# Patient Record
Sex: MALE | Race: BLACK OR AFRICAN AMERICAN | Employment: FULL TIME | URBAN - METROPOLITAN AREA
[De-identification: names, ages, dates, MRNs, and addresses within clinical notes are randomized per-mention and may not be internally consistent; named-entity substitution may affect disease eponyms.]

---

## 2017-01-04 ENCOUNTER — HOSPITAL ENCOUNTER (OUTPATIENT)
Dept: PHYSICAL THERAPY | Age: 27
Discharge: HOME | End: 2017-01-04
Payer: SELF-PAY

## 2017-01-04 PROCEDURE — 97016 VASOPNEUMATIC DEVICE THERAPY: CPT

## 2017-01-04 PROCEDURE — 97140 MANUAL THERAPY 1/> REGIONS: CPT

## 2017-01-04 PROCEDURE — 97110 THERAPEUTIC EXERCISES: CPT

## 2017-01-04 NOTE — PROGRESS NOTES
PT DAILY TREATMENT NOTE     Patient Name: Casa Leo  Date:2017  : 1990  [x]  Patient  Verified  Payor: Angelina Henley / Plan: Giulia Rey PPO / Product Type: PPO /    In time:355  Out time:430  Total Treatment Time (min): 35  Visit #: 2 of 16    Treatment Area: Pain in shoulder region, right [M25.511]    SUBJECTIVE  Pain Level (0-10 scale): 7/10  Any medication changes, allergies to medications, adverse drug reactions, diagnosis change, or new procedure performed?: [x] No    [] Yes (see summary sheet for update)  Subjective functional status/changes:   [] No changes reported  Sometimes I forget I had a surgery and I go to move my arm.     OBJECTIVE    Modality rationale: decrease inflammation and decrease pain to improve the patients ability to increase activity/position tolerance   Min Type Additional Details    [] Estim:  []Unatt       []IFC  []Premod                        []Other:  []w/ice   []w/heat  Position:  Location:    [] Estim: []Att    []TENS instruct  []NMES                    []Other:  []w/US   []w/ice   []w/heat  Position:  Location:    []  Traction: [] Cervical       []Lumbar                       [] Prone          []Supine                       []Intermittent   []Continuous Lbs:  [] before manual  [] after manual    []  Ultrasound: []Continuous   [] Pulsed                           []1MHz   []3MHz W/cm2:  Location:    []  Iontophoresis with dexamethasone         Location: [] Take home patch   [] In clinic    []  Ice     []  heat  []  Ice massage  []  Laser   []  Anodyne Position:  Location:    []  Laser with stim  []  Other:  Position:  Location:   10 [x]  Vasopneumatic Device Pressure:       [] lo [x] med [] hi   Temperature: [x] lo [] med [] hi   [] Skin assessment post-treatment:  []intact []redness- no adverse reaction    []redness - adverse reaction:      min []Eval                  []Re-Eval       17 min Therapeutic Exercise:  [x] See flow sheet :   Rationale: increase ROM and increase strength to improve the patients ability to in prep for active use     min Therapeutic Activity:  []  See flow sheet :         min Neuromuscular Re-education:  []  See flow sheet :       8 min Manual Therapy:  Right shoulder PROM in supine   Rationale: increase ROM and increase tissue extensibility to restore normal joint mobility for ADL's     min Gait Training:  ___ feet with ___ device on level surfaces with ___ level of assist   Rationale: With   [] TE   [] TA   [] neuro   [] other: Patient Education: [x] Review HEP    [] Progressed/Changed HEP based on:   [] positioning   [] body mechanics   [] transfers   [] heat/ice application    [] other:      Other Objective/Functional Measures: none taken today     Pain Level (0-10 scale) post treatment: 4/10    ASSESSMENT/Changes in Function: 1st session since eval    Patient will continue to benefit from skilled PT services to modify and progress therapeutic interventions, address ROM deficits, address strength deficits, analyze and address soft tissue restrictions, analyze and cue movement patterns and assess and modify postural abnormalities to attain remaining goals.      [x]  See Plan of Care  []  See progress note/recertification  []  See Discharge Summary           PLAN  []  Upgrade activities as tolerated     [x]  Continue plan of care  []  Update interventions per flow sheet       []  Discharge due to:_  []  Other:_      Anika Lowry PT 1/4/2017  3:51 PM    Future Appointments  Date Time Provider Wendi Yao   1/4/2017 4:00 PM BRAULIO Lockhart [facility]   1/9/2017 4:00 PM LITZY Wilson [facility]   1/13/2017 8:30 AM LITZY Wilson [facility]   1/16/2017 12:00 PM BRAULIO Lockhart [facility]   1/20/2017 10:30 AM THE [facility] PT VICTORY [facility]   1/23/2017 11:30 AM LITZY Wilson [facility]   1/27/2017 10:00 AM THE [facility] PT VICTORY [facility]

## 2017-01-13 ENCOUNTER — HOSPITAL ENCOUNTER (OUTPATIENT)
Dept: PHYSICAL THERAPY | Age: 27
Discharge: HOME | End: 2017-01-13
Payer: SELF-PAY

## 2017-01-13 PROCEDURE — 97016 VASOPNEUMATIC DEVICE THERAPY: CPT

## 2017-01-13 PROCEDURE — 97140 MANUAL THERAPY 1/> REGIONS: CPT

## 2017-01-13 PROCEDURE — 97110 THERAPEUTIC EXERCISES: CPT

## 2017-01-13 NOTE — PROGRESS NOTES
PT DAILY TREATMENT NOTE     Patient Name: Mariana Macias  Date:2017  : 1990  [x]  Patient  Verified  Payor: Juan Pennington / Plan: [employer] PPO / Product Type: PPO /    In UNCF:0664  Out time:0925  Total Treatment Time (min): 50  Visit #: 3 of 16    Treatment Area: Pain in shoulder region, right [M25.511]    SUBJECTIVE  Pain Level (0-10 scale): 0/10  Any medication changes, allergies to medications, adverse drug reactions, diagnosis change, or new procedure performed?: [x] No    [] Yes (see summary sheet for update)  Subjective functional status/changes:   [] No changes reported  I have some clicking in my shoulder. It sounds like a creaky door.     OBJECTIVE    Modality rationale: decrease inflammation and decrease pain to improve the patients ability to improve positioning and ADL's   Min Type Additional Details    [] Estim:  []Unatt       []IFC  []Premod                        []Other:  []w/ice   []w/heat  Position:  Location:    [] Estim: []Att    []TENS instruct  []NMES                    []Other:  []w/US   []w/ice   []w/heat  Position:  Location:    []  Traction: [] Cervical       []Lumbar                       [] Prone          []Supine                       []Intermittent   []Continuous Lbs:  [] before manual  [] after manual    []  Ultrasound: []Continuous   [] Pulsed                           []1MHz   []3MHz W/cm2:  Location:    []  Iontophoresis with dexamethasone         Location: [] Take home patch   [] In clinic    []  Ice     []  heat  []  Ice massage  []  Laser   []  Anodyne Position:  Location:    []  Laser with stim  []  Other:  Position:  Location:   10 [x]  Vasopneumatic Device right sh Pressure:       [] lo [x] med [] hi   Temperature: [x] lo [] med [] hi   [x] Skin assessment post-treatment:  [x]intact []redness- no adverse reaction    []redness - adverse reaction:      min []Eval                  []Re-Eval       26 min Therapeutic Exercise:  [x] See flow sheet : Rationale: increase ROM and increase strength to improve the patients ability to progress per protocol      min Therapeutic Activity:  []  See flow sheet :         min Neuromuscular Re-education:  []  See flow sheet :       14 min Manual Therapy:  Right sh PROM, DTM/TPM R UT/lev scap   Rationale: decrease pain, increase ROM, increase tissue extensibility and decrease trigger points to improve positioning and ADL's     min Gait Training:  ___ feet with ___ device on level surfaces with ___ level of assist   Rationale: With   [] TE   [x] TA   [] neuro   [] other: Patient Education: [x] Review HEP    [] Progressed/Changed HEP based on:   [x] positioning   [] body mechanics   [] transfers   [x] heat/ice application    [] other:      Other Objective/Functional Measures: Right sh PROM flexion: 110, scap: 145, IR: to abdomen (per protocol), ER: ([email or web address]     Pain Level (0-10 scale) post treatment: 0/10    ASSESSMENT/Changes in Function: PROM has progressed with pain resolving at this time. Patient will continue to benefit from skilled PT services to modify and progress therapeutic interventions, address functional mobility deficits, address ROM deficits, address strength deficits, analyze and address soft tissue restrictions and analyze and cue movement patterns to attain remaining goals. [x]  See Plan of Care  []  See progress note/recertification  []  See Discharge Summary         Progress towards goals / Updated goals:  Short Term Goals: To be accomplished in 8 treatments:  1)Passive right shoulder flexion will increase to 140 degrees to improve his tolerance to dressing tasks. Status at eval: 80 degrees with pain  Current: 110 degrees     Long Term Goals: To be accomplished in 16 treatments:  1) Right shoulder active flexion ROM will be 130 degrees to improve his ability to perform bathing tasks.    Status at eval: 80 degrees passively with pain  Current: no change     2) The pt will be able to perform right shoulder IR to L2 to improve his ability to tuck in his shirt. Status at eval: 43 degrees (limited to abdomen per surgeon for now)  Current: no change     3) Right shoulder flex, abd, IR and ER strength will be 4/5 to improve his tolerance to lifting tasks.   Status at eval: unable to test today due to proximity of surgery  Current: no change       PLAN  []  Upgrade activities as tolerated     [x]  Continue plan of care  []  Update interventions per flow sheet       []  Discharge due to:_  []  Other:_      Oliver Mcfarlane PTA 1/13/2017  8:40 AM    Future Appointments  Date Time Provider Wendi Yao   1/16/2017 12:00 PM Mario Alfaro PT [facility]   1/20/2017 10:30 AM Mario Alfaro PT [facility]   1/23/2017 11:30 AM LITZY Dunbar [facility]   1/27/2017 10:00 AM BRAULIO Park THE [facility]

## 2017-01-16 ENCOUNTER — HOSPITAL ENCOUNTER (OUTPATIENT)
Dept: PHYSICAL THERAPY | Age: 27
End: 2017-01-16
Payer: SELF-PAY

## 2017-01-20 ENCOUNTER — APPOINTMENT (OUTPATIENT)
Dept: PHYSICAL THERAPY | Age: 27
End: 2017-01-20
Payer: SELF-PAY

## 2017-01-23 ENCOUNTER — APPOINTMENT (OUTPATIENT)
Dept: PHYSICAL THERAPY | Age: 27
End: 2017-01-23
Payer: SELF-PAY

## 2017-01-27 ENCOUNTER — APPOINTMENT (OUTPATIENT)
Dept: PHYSICAL THERAPY | Age: 27
End: 2017-01-27
Payer: SELF-PAY

## 2017-02-06 ENCOUNTER — HOSPITAL ENCOUNTER (OUTPATIENT)
Dept: PHYSICAL THERAPY | Age: 27
Discharge: HOME | End: 2017-02-06
Payer: COMMERCIAL

## 2017-02-06 PROCEDURE — 97110 THERAPEUTIC EXERCISES: CPT

## 2017-02-06 PROCEDURE — 97140 MANUAL THERAPY 1/> REGIONS: CPT

## 2017-02-06 NOTE — PROGRESS NOTES
PT DAILY TREATMENT NOTE     Patient Name: Iona Nobles  Date:2017  : 1990  [x]  Patient  Verified  Payor: SELF PAY / [facility] / Product Type: Self Pay /    In time:2:00  Out time:2:39  Total Treatment Time (min): 39  Visit #: 4 of 11    Treatment Area: Pain in shoulder region, right [M25.511]    SUBJECTIVE  Pain Level (0-10 scale): 0/10  Any medication changes, allergies to medications, adverse drug reactions, diagnosis change, or new procedure performed?: [x] No    [] Yes (see summary sheet for update)  Subjective functional status/changes:   [] No changes reported  The pt reports that sine his one scar has been worked on at therapy his pain has improved.     OBJECTIVE    Modality rationale:    Min Type Additional Details    [] Estim:  []Unatt       []IFC  []Premod                        []Other:  []w/ice   []w/heat  Position:  Location:    [] Estim: []Att    []TENS instruct  []NMES                    []Other:  []w/US   []w/ice   []w/heat  Position:  Location:    []  Traction: [] Cervical       []Lumbar                       [] Prone          []Supine                       []Intermittent   []Continuous Lbs:  [] before manual  [] after manual    []  Ultrasound: []Continuous   [] Pulsed                           []1MHz   []3MHz W/cm2:  Location:    []  Iontophoresis with dexamethasone         Location: [] Take home patch   [] In clinic    []  Ice     []  heat  []  Ice massage  []  Laser   []  Anodyne Position:  Location:    []  Laser with stim  []  Other:  Position:  Location:    []  Vasopneumatic Device Pressure:       [] lo [] med [] hi   Temperature: [] lo [] med [] hi   [] Skin assessment post-treatment:  []intact []redness- no adverse reaction    []redness - adverse reaction:      min []Eval                  []Re-Eval       31 min Therapeutic Exercise:  [x] See flow sheet :   Rationale: increase ROM to improve the patients ability to perform dressing tasks     min Therapeutic Activity:  []  See flow sheet :   Rationale:         min Neuromuscular Re-education:  []  See flow sheet :   Rationale:     8 min Manual Therapy:  Scar mobilization of anterior portal site    Rationale: decrease pain, increase ROM and increase tissue extensibility to improve his ability to perform household tasks     min Gait Training:  ___ feet with ___ device on level surfaces with ___ level of assist   Rationale: With   [] TE   [] TA   [] neuro   [] other: Patient Education: [x] Review HEP    [] Progressed/Changed HEP based on:   [] positioning   [] body mechanics   [] transfers   [] heat/ice application    [] other:      Other Objective/Functional Measures:      Pain Level (0-10 scale) post treatment: 0/10    ASSESSMENT/Changes in Function: The pt was able to progress with ROM exercises today which caused some soreness but did not cause any lasting pain. Patient will continue to benefit from skilled PT services to modify and progress therapeutic interventions, address ROM deficits, analyze and address soft tissue restrictions, analyze and cue movement patterns, analyze and modify body mechanics/ergonomics, assess and modify postural abnormalities and instruct in home and community integration to attain remaining goals.      []  See Plan of Care  [x]  See progress note/recertification  []  See Discharge Summary           PLAN  [x]  Upgrade activities as tolerated     [x]  Continue plan of care  []  Update interventions per flow sheet       []  Discharge due to:_  []  Other:_      Sofia Pickering, PT 2/6/2017  4:26 PM    Future Appointments  Date Time Provider Wendi Yao   2/9/2017 9:00 AM LITZY Hooper [facility]   2/14/2017 8:00 AM LITZY Hooper [facility]   2/17/2017 8:30 AM LITZY Hooper [facility]   2/21/2017 9:00 AM LITZY Hooper [facility]   2/23/2017 9:30 AM LITZY Hooper [facility]   2/28/2017 8:30 AM LITZY Hooper [facility] 3/3/2017 8:30 AM Brian Valdez PT [facility]

## 2017-02-06 NOTE — PROGRESS NOTES
[facility] at [address] [place] [address]  Phone: [phone]   Fax: [phone]    Progress Note  Patient name: Elsa Weiss Start of Care: 2016   Referral source: Toni Linda MD : 1990   Medical/Treatment Diagnosis: Pain in shoulder region, right [M25.511] Onset Date:2016     Prior Hospitalization: see medical history Provider#: [id number]   Medications: Verified on Patient Summary List    Comorbidities: arthritis, post traumatic stress disorder, recent weight change of more than 10lbs, right shoulder labral repair 2016, left knee microfracture surgery  Prior Level of Function: pain with overhead tasks and lifting    Visits from Start of Care: 3    Missed Visits: 1 cancelled but pt did not always schedule to recommended frequency    Short Term Goals: To be accomplished in 8 treatments:  1)Passive right shoulder flexion will increase to 140 degrees to improve his tolerance to dressing tasks. Status at eval: 80 degrees with pain  Current: Progressing, 115 degrees       Long Term Goals: To be accomplished in 16 treatments:  1) Right shoulder active flexion ROM will be 130 degrees to improve his ability to perform bathing tasks. Status at eval: 80 degrees passively with pain  Current: Progressing, 115 degrees       2) The pt will be able to perform right shoulder IR to L2 to improve his ability to tuck in his shirt. Status at eval: 43 degrees (limited to abdomen per surgeon for now)  Current: Progressing, L5      3) Right shoulder flex, abd, IR and ER strength will be 4/5 to improve his tolerance to lifting tasks.   Status at eval: unable to test today due to proximity of surgery  Current: not to the strengthening phase yet    Established Goals:          Excellent Good         Limited           None  [x] Increased ROM   []  [x]  []  []  [] Increased Strength  []  []  []  []  [] Increased Mobility  []  []  []  []   [x] Decreased Pain   []  [x]  []  []  [] Decreased Swelling  []  []  []  []    Key Functional Changes: pt is having less pain over all now making ADLs easier and he can also reach further    The pt has made gains since starting therapy even though he has only attended a few treatments with his most recent on January 13th. Since then he has had a follow up with his surgeon and was encouraged to continue with stretching exercises and is now at the stage where he can stretch into every direction as tolerated. He states that he is able to attend therapy more regularly now and he would benefit from continuing so that he can be progressed with AAROM and AROM exercises so that he can perform ADLs without compensations. Updated Goals: to be achieved in 8 treatments:  Right shoulder active flexion ROM will be 130 degrees to improve his ability to perform bathing tasks. Status at eval: 80 degrees passively with pain  Status at progress note: Progressing, 115 degrees       The pt will be able to perform right shoulder IR to L2 to improve his ability to tuck in his shirt. Status at eval: 43 degrees (limited to abdomen per surgeon for now)  Status at progress note: Progressing, L5      Right shoulder flex, abd, IR and ER strength will be 4/5 to improve his tolerance to lifting tasks.   Status at eval: unable to test today due to proximity of surgery  Status at progress note: not to the strengthening phase yet    ASSESSMENT/RECOMMENDATIONS:  [x]Continue therapy per initial plan/protocol at a frequency of  2 x per week for 4 weeks  []Continue therapy with the following recommended changes:_____________________      _____________________________________________________________________  []Discontinue therapy progressing towards or have reached established goals  []Discontinue therapy due to lack of appreciable progress towards goals  []Discontinue therapy due to lack of attendance or compliance  []Await Physician's recommendations/decisions regarding therapy  []Other:________________________________________________________________    Thank you for this referral.   Saurav Lehman, PT 2/6/2017 2:20 PM  NOTE TO PHYSICIAN:  PLEASE COMPLETE THE ORDERS BELOW AND   FAX TO [facility]: [phone]  If you are unable to process this request in 24 hours please contact our office:   [phone]  []  I have read the above report and request that my patient continue as recommended. []  I have read the above report and request that my patient continue therapy with the following changes/special instructions:________________________________________  []I have read the above report and request that my patient be discharged from therapy.     Physicians signature: ______________________________Date: ______Time:______

## 2017-02-09 ENCOUNTER — HOSPITAL ENCOUNTER (OUTPATIENT)
Dept: PHYSICAL THERAPY | Age: 27
Discharge: HOME | End: 2017-02-09
Payer: COMMERCIAL

## 2017-02-09 PROCEDURE — 97110 THERAPEUTIC EXERCISES: CPT

## 2017-02-09 NOTE — PROGRESS NOTES
PT DAILY TREATMENT NOTE 12    Patient Name: Page Michaels  Date:2017  : 1990  [x]  Patient  Verified  Payor: SELF PAY / Plan: [facility] SELF PAY / Product Type: Self Pay /    In time:0900  Out time:0935  Total Treatment Time (min): 35  Visit #: 5 of 11    Treatment Area: Pain in shoulder region, right [M25.511]    SUBJECTIVE  Pain Level (0-10 scale): 3-4/10  Any medication changes, allergies to medications, adverse drug reactions, diagnosis change, or new procedure performed?: [x] No    [] Yes (see summary sheet for update)  Subjective functional status/changes:   [] No changes reported  I think I slept wrong on my shoulder.     OBJECTIVE    Modality rationale:    Min Type Additional Details    [] Estim:  []Unatt       []IFC  []Premod                        []Other:  []w/ice   []w/heat  Position:  Location:    [] Estim: []Att    []TENS instruct  []NMES                    []Other:  []w/US   []w/ice   []w/heat  Position:  Location:    []  Traction: [] Cervical       []Lumbar                       [] Prone          []Supine                       []Intermittent   []Continuous Lbs:  [] before manual  [] after manual    []  Ultrasound: []Continuous   [] Pulsed                           []1MHz   []3MHz W/cm2:  Location:    []  Iontophoresis with dexamethasone         Location: [] Take home patch   [] In clinic    []  Ice     []  heat  []  Ice massage  []  Laser   []  Anodyne Position:  Location:    []  Laser with stim  []  Other:  Position:  Location:    []  Vasopneumatic Device Pressure:       [] lo [] med [] hi   Temperature: [] lo [] med [] hi   [] Skin assessment post-treatment:  []intact []redness- no adverse reaction    []redness - adverse reaction:      min []Eval                  []Re-Eval       35 min Therapeutic Exercise:  [x] See flow sheet :   Rationale: increase ROM and increase strength to improve the patients ability to improve ADL's     min Therapeutic Activity:  []  See flow sheet : min Neuromuscular Re-education:  []  See flow sheet :        min Manual Therapy:          min Gait Training:  ___ feet with ___ device on level surfaces with ___ level of assist   Rationale: With   [] TE   [] TA   [] neuro   [] other: Patient Education: [x] Review HEP    [] Progressed/Changed HEP based on:   [] positioning   [] body mechanics   [] transfers   [] heat/ice application    [] other:      Other Objective/Functional Measures: FOTO: 46     Pain Level (0-10 scale) post treatment: 4/10    ASSESSMENT/Changes in Function: Pt reporting increased use of  right UE and reports pain today sec to sleeping position    Patient will continue to benefit from skilled PT services to modify and progress therapeutic interventions, address functional mobility deficits, address ROM deficits, address strength deficits, analyze and address soft tissue restrictions, analyze and cue movement patterns, analyze and modify body mechanics/ergonomics and instruct in home and community integration to attain remaining goals. []  See Plan of Care  [x]  See progress note/recertification.  Pt denied CP or vaso sec to TC.  []  See Discharge Summary           PLAN  [x]  Upgrade activities as tolerated     [x]  Continue plan of care  []  Update interventions per flow sheet       []  Discharge due to:_  []  Other:_      Milton Saez PTA 2/9/2017  9:02 AM    Future Appointments  Date Time Provider Wendi Yao   2/14/2017 8:00 AM LITZY Lynn [facility]   2/17/2017 8:30 AM LITZY Lynn [facility]   2/21/2017 9:00 AM LITZY Lynn [facility]   2/23/2017 9:30 AM LITZY Lynn [facility]   2/28/2017 8:30 AM LITZY Lynn [facility]   3/3/2017 8:30 AM BRAULIO Ng THE [facility]

## 2017-02-13 ENCOUNTER — OFFICE VISIT (OUTPATIENT)
Dept: FAMILY MEDICINE CLINIC | Age: 27
End: 2017-02-13

## 2017-02-13 VITALS
RESPIRATION RATE: 16 BRPM | HEIGHT: 68 IN | HEART RATE: 94 BPM | TEMPERATURE: 98.9 F | WEIGHT: 204 LBS | BODY MASS INDEX: 30.92 KG/M2 | DIASTOLIC BLOOD PRESSURE: 82 MMHG | OXYGEN SATURATION: 99 % | SYSTOLIC BLOOD PRESSURE: 120 MMHG

## 2017-02-13 DIAGNOSIS — F90.9 ATTENTION DEFICIT HYPERACTIVITY DISORDER (ADHD), UNSPECIFIED ADHD TYPE: Chronic | ICD-10-CM

## 2017-02-13 DIAGNOSIS — G47.50 PARASOMNIA, UNSPECIFIED: ICD-10-CM

## 2017-02-13 DIAGNOSIS — Z28.21 INFLUENZA VACCINATION DECLINED BY PATIENT: ICD-10-CM

## 2017-02-13 DIAGNOSIS — F31.9 BIPOLAR AFFECTIVE DISORDER, REMISSION STATUS UNSPECIFIED (HCC): Chronic | ICD-10-CM

## 2017-02-13 DIAGNOSIS — G43.109 OCULAR MIGRAINE: Primary | Chronic | ICD-10-CM

## 2017-02-13 RX ORDER — SUMATRIPTAN 100 MG/1
100 TABLET, FILM COATED ORAL
Qty: 12 TAB | Refills: 11 | Status: SHIPPED | OUTPATIENT
Start: 2017-02-13 | End: 2018-05-23

## 2017-02-13 NOTE — PROGRESS NOTES
Roxie Malave is a 32 y.o. male  Pt here today for follow up visit. 1. Have you been to the ER, urgent care clinic since your last visit? Hospitalized since your last visit? Yes Where: [facility]    2. Have you seen or consulted any other health care providers outside of the [address] [place] [address] since your last visit? Include any pap smears or colon screening.  Yes Where: ortho

## 2017-02-13 NOTE — PATIENT INSTRUCTIONS
STAY UP TO DATE WITH YOUR PREVENTIVE HEALTH:     Please review the following recommendations and if you are not sure that your healthcare is up to date, ask your provider at your next scheduled office visit. -- Yearly preventive visits (sometimes called \"physicals\") are recommended for all adults. Medicare and Medicaid do not pay for physicals. Medicare covers a yearly wellness visit that differs in many ways from a traditional physical, but is an opportunity to make sure you are maximizing the preventive services that will be covered by Medicare. Each insurance carrier will have different regulations about what services may be covered, so be sure to talk with your insurance provider before scheduling a visit. -- Colon cancer screening is recommended for all patients 48 and older with either colonoscopy (interval will vary depending on results) or yearly stool testing.      -- Mammogram is recommended every 2 years for women ages 36 to 76. -- Pap smear is recommended every 3-5 years for women aged 24 to 72, unless your cervix has been surgically removed for benign reasons. -- Flu shot is recommended every fall for adults of all ages. -- Prevnar 15 is recommended at the age of 72 for all adults. -- Pneumovax is recommended one year after Prevnar 13 for all adults, but earlier if you have a history of chronic health problems (including diabetes and asthma) or smoking. -- Tetanus boosters are recommended every 10 years for adults of all ages. Medicare and Medicaid do not cover tetanus as a preventive booster, but will pay for patients to receive a booster in the event of certain injuries. INSTRUCTIONS AFTER YOUR VISIT ON 2/13/2017     -- Try imitrex for migraines. REFERRALS - If you were referred to a specialist or consultant today, this often needs to be authorized by your insurance company before it can be scheduled.    You should be contacted within 2 weeks by the consultant's office to schedule the visit. If you do not hear from the specialist's office in that time frame, please call my office and the staff here can check on the status of your referral.        TESTING RESULTS   -- Lab results may become available for your review on [employer] before your provider has an opportunity to write you a note about results. Review of routine lab results will generally be done in 10-14 days. Please save your inquiries about results until your provider has had time to review them. -- If you have testing done outside of the office, please call to let us know the date and location that your testing was completed. Results can often be obtained faster if an inquiry is run. MEDICATION REFILLS      -- Controlled substance refills (medications that require printed prescriptions for monitoring of use per [facility] guidelines) must be picked up in the office and per medical group policy will require a scheduled office visit with the provider. Calling the after hours answering service to request a controlled substance represents a violation of [facility] controlled substance policy. -- All other medication refills are to be requested by calling your pharmacy during regular office hours. The after hours physician on call is not required to respond to calls about medication refills. It is your responsibility to keep track of when you may be running out of medication and to place refill requests at least 3 business days prior. [place]      Scheduling appointments can be done at the  or by calling [phone] and selecting option #1. HOLIDAY CLOSURES:     The office is closed on six major holidays each year:  New Year's Day, July 4th, Memorial Day, Labor Day, Thanksgiving, and [place] Day. Lab and X-ray services are not available on those days.
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
## 2017-02-13 NOTE — MR AVS SNAPSHOT
Visit Information Date & Time Provider Department Dept. Phone Encounter #  
 2/13/2017  1:00 PM Ag Aneudy, [place] [facility] [phone] [id number] Upcoming Health Maintenance Date Due INFLUENZA AGE 9 TO ADULT 8/1/2016 DTaP/Tdap/Td series (2 - Td) 7/23/2026 Allergies as of 2/13/2017  Review Complete On: 2/13/2017 By: Ag Amado MD  
  
 Severity Noted Reaction Type Reactions Mobic [Meloxicam]  12/30/2016    Other (comments) bruising Tylenol [Acetaminophen]  12/02/2015    Rash Current Immunizations  Reviewed on 9/7/2016 Name Date Tdap 7/23/2016 Not reviewed this visit You Were Diagnosed With   
  
 Codes Comments Ocular migraine    -  Primary ICD-10-CM: M10.506 ICD-9-CM: 346.80 Attention deficit hyperactivity disorder (ADHD), unspecified ADHD type     ICD-10-CM: F90.9 ICD-9-CM: 314.01 Bipolar affective disorder, remission status unspecified ([facility]     ICD-10-CM: F31.9 ICD-9-CM: 296.80 Vitals BP Pulse Temp Resp Height(growth percentile) Weight(growth percentile) 120/82 94 98.9 °F (37.2 °C) (Oral) 16 5' 8\" (1.727 m) 204 lb (92.5 kg) SpO2 BMI Smoking Status 99% 31.02 kg/m2 Former Smoker Vitals History BMI and BSA Data Body Mass Index Body Surface Area 31.02 kg/m 2 2.11 m 2 Preferred Pharmacy Pharmacy Name Phone [facility] [address] [place] [address] [phone] Your Updated Medication List  
  
   
This list is accurate as of: 2/13/17  1:32 PM.  Always use your most recent med list.  
  
  
  
  
 ABILIFY 2 mg tablet Generic drug:  ARIPiprazole Take 2 mg by mouth daily. ADDERALL 30 mg tablet Generic drug:  dextroamphetamine-amphetamine Take 30 mg by mouth two (2) times a day. LaMICtal 150 mg tablet Generic drug:  lamoTRIgine Take  by mouth daily. SUMAtriptan 100 mg tablet Commonly known as:  IMITREX Take 1 Tab by mouth once as needed for Migraine for up to 1 dose. Prescriptions Sent to Pharmacy Refills SUMAtriptan (IMITREX) 100 mg tablet 11 Sig: Take 1 Tab by mouth once as needed for Migraine for up to 1 dose. Class: Normal  
 Pharmacy: [facility] [id number] [facility] - [place]  #: [phone] Route: Oral  
  
We Performed the Following REFERRAL TO PSYCHIATRY [REF91 Custom] Comments:  
 Please evaluate patient for ADHD and has tentative diagnosis of bipolar, currently on lamictal and abilify - ? TBI causing mood instability vs PTSD. To-Do List   
 02/14/2017 8:00 AM  
  Appointment with Andres Stinson PTA at [address] [place] [address],[address] ([phone])  
  
 02/17/2017 8:30 AM  
  Appointment with Andres Stinson PTA at [address] [place] [address],[address] ([phone])  
  
 02/21/2017 9:00 AM  
  Appointment with Andres Stinson PTA at [address] [place] [address],[address] ([phone])  
  
 02/23/2017 9:30 AM  
  Appointment with Andres Stinson PTA at [address] [facility] ([phone])  
  
 02/28/2017 8:30 AM  
  Appointment with Andres Stinson PTA at [address] [place] [address],[address] ([phone])  
  
 03/03/2017 8:30 AM  
  Appointment with Pro Pereira PT at [address] [facility] ([phone]) Referral Information Referral ID Referred By Referred To  
  
 [id number] [place], [facility] [address]   
   [address], [address] Phone: [phone] Fax: [phone] Visits Status Start Date End Date 1 New Request 2/13/17 2/13/18 If your referral has a status of pending review or denied, additional information will be sent to support the outcome of this decision. Patient Instructions STAY UP TO DATE WITH YOUR PREVENTIVE HEALTH:    
Please review the following recommendations and if you are not sure that your healthcare is up to date, ask your provider at your next scheduled office visit. -- Yearly preventive visits (sometimes called \"physicals\") are recommended for all adults. Medicare and Medicaid do not pay for physicals. Medicare covers a yearly wellness visit that differs in many ways from a traditional physical, but is an opportunity to make sure you are maximizing the preventive services that will be covered by Medicare. Each insurance carrier will have different regulations about what services may be covered, so be sure to talk with your insurance provider before scheduling a visit. -- Colon cancer screening is recommended for all patients 48 and older with either colonoscopy (interval will vary depending on results) or yearly stool testing.   
 
-- Mammogram is recommended every 2 years for women ages 36 to 76. -- Pap smear is recommended every 3-5 years for women aged 24 to 72, unless your cervix has been surgically removed for benign reasons. -- Flu shot is recommended every fall for adults of all ages. -- Prevnar 15 is recommended at the age of 72 for all adults. -- Pneumovax is recommended one year after Prevnar 13 for all adults, but earlier if you have a history of chronic health problems (including diabetes and asthma) or smoking. -- Tetanus boosters are recommended every 10 years for adults of all ages. Medicare and Medicaid do not cover tetanus as a preventive booster, but will pay for patients to receive a booster in the event of certain injuries. INSTRUCTIONS AFTER YOUR VISIT ON 2/13/2017  
 
-- Try imitrex for migraines. REFERRALS - If you were referred to a specialist or consultant today, this often needs to be authorized by your insurance company before it can be scheduled. You should be contacted within 2 weeks by the consultant's office to schedule the visit.   If you do not hear from the specialist's office in that time frame, please call my office and the staff here can check on the status of your referral.   
 
 
TESTING RESULTS  
-- Lab results may become available for your review on [employer] before your provider has an opportunity to write you a note about results. Review of routine lab results will generally be done in 10-14 days. Please save your inquiries about results until your provider has had time to review them. -- If you have testing done outside of the office, please call to let us know the date and location that your testing was completed. Results can often be obtained faster if an inquiry is run. MEDICATION REFILLS   
 
-- Controlled substance refills (medications that require printed prescriptions for monitoring of use per [facility] guidelines) must be picked up in the office and per medical group policy will require a scheduled office visit with the provider. Calling the after hours answering service to request a controlled substance represents a violation of [facility] controlled substance policy. -- All other medication refills are to be requested by calling your pharmacy during regular office hours. The after hours physician on call is not required to respond to calls about medication refills. It is your responsibility to keep track of when you may be running out of medication and to place refill requests at least 3 business days prior. [place] Scheduling appointments can be done at the  or by calling [phone] and selecting option #1. HOLIDAY CLOSURES:  
 
The office is closed on six major holidays each year:  New Year's Day, July 4th, Memorial Day, Labor Day, Thanksgiving, and [place] Day. Lab and X-ray services are not available on those days. Introducing [facility]! Dear Jere Maldonado: 
Thank you for requesting a [employer] account.   Our records indicate that you already have an active [employer] account. You can access your account anytime at [email or web address] Did you know that you can access your hospital and ER discharge instructions at any time in [employer]? You can also review all of your test results from your hospital stay or ER visit. Additional Information If you have questions, please visit the Frequently Asked Questions section of the [employer] website at [email or web address] Remember, [employer] is NOT to be used for urgent needs. For medical emergencies, dial 911. Now available from your iPhone and Android! Please provide this summary of care documentation to your next provider. Your primary care clinician is listed as Rahul Andrade. If you have any questions after today's visit, please call [phone].

## 2017-02-14 ENCOUNTER — APPOINTMENT (OUTPATIENT)
Dept: PHYSICAL THERAPY | Age: 27
End: 2017-02-14
Payer: COMMERCIAL

## 2017-02-21 ENCOUNTER — APPOINTMENT (OUTPATIENT)
Dept: PHYSICAL THERAPY | Age: 27
End: 2017-02-21
Payer: COMMERCIAL

## 2017-03-23 NOTE — PROGRESS NOTES
[facility] at [address] [place] [address]  Phone: [phone]   Fax: [phone]    Discharge Summary    Patient name: Sophie Hernandez     Start of Care: 2016  Referral source: Timo Ornelas MD    : 1990  Medical/Treatment Diagnosis: Pain in shoulder region, right [M25.511]  Onset Date:3/23/2017  Prior Hospitalization: see medical history   Provider#: [id number]  Comorbidities: arthritis, post traumatic stress disorder, recent weight change of more than 10lbs, right shoulder labral repair 2016, left knee microfracture surgery  Prior Level of Function: pain with overhead tasks and lifting:   Medications: Verified on Patient Summary List    Visits from Start of Care: 5    Missed Visits: 6  Reporting Period : 2017 to 3/23/17    Goal:Passive right shoulder flexion will increase to 140 degrees to improve his tolerance to dressing tasks. Status at eval: 80 degrees with pain  Status at last note:115  Status at discharge: not met    Goal:The pt will be able to perform right shoulder IR to L2 to improve his ability to tuck in his shirt. Status at eval: 43 degrees (limited to abdomen per surgeon for now)  Status at last note/certification: progressing, L5  Status at discharge: not met    Goal:Right shoulder flex, abd, IR and ER strength will be 4/5 to improve his tolerance to lifting tasks. Status at eval: unable to test today due to proximity of surgery  Status at last note/certification: unable to assess due to proximity of surgery  Status at discharge: not met    Assessment/ Summary of Care:  Pt was being seen for strengthening, progressive ROM with surgical limitations, HEP and pt education. Pt cancelled several visits with no response to clinic's follow up calls. Will d/c due to non-compliance at this time.     RECOMMENDATIONS:  [x]Discontinue therapy: []Patient has reached or is progressing toward set goals      [x]Patient is non-compliant or has abdicated      []Due to lack of appreciable progress towards set goals    Josue Evans, PT 3/23/2017 9:54 AM

## 2017-03-29 ENCOUNTER — PATIENT MESSAGE (OUTPATIENT)
Dept: FAMILY MEDICINE CLINIC | Age: 27
End: 2017-03-29

## 2017-06-27 ENCOUNTER — OFFICE VISIT (OUTPATIENT)
Dept: FAMILY MEDICINE CLINIC | Age: 27
End: 2017-06-27

## 2017-06-27 VITALS
SYSTOLIC BLOOD PRESSURE: 127 MMHG | BODY MASS INDEX: 28.04 KG/M2 | HEART RATE: 91 BPM | WEIGHT: 185 LBS | TEMPERATURE: 99.2 F | OXYGEN SATURATION: 100 % | HEIGHT: 68 IN | DIASTOLIC BLOOD PRESSURE: 84 MMHG

## 2017-06-27 DIAGNOSIS — F51.5 NIGHTMARE: ICD-10-CM

## 2017-06-27 DIAGNOSIS — R07.9 CHEST PAIN AT REST: ICD-10-CM

## 2017-06-27 DIAGNOSIS — F41.0 PANIC DISORDER: Primary | ICD-10-CM

## 2017-06-27 DIAGNOSIS — R00.2 PALPITATIONS: ICD-10-CM

## 2017-06-27 NOTE — MR AVS SNAPSHOT
Visit Information Date & Time Provider Department Dept. Phone Encounter #  
 6/27/2017 11:00 AM Holly Ren, 3 [facility] [phone] [id number] Follow-up Instructions Return for Preventive \"CPE\" 1 yr, labs 1-2 wks prior. Upcoming Health Maintenance Date Due INFLUENZA AGE 9 TO ADULT 8/1/2017 DTaP/Tdap/Td series (2 - Td) 7/23/2026 Allergies as of 6/27/2017  Review Complete On: 6/27/2017 By: Holly Ren MD  
  
 Severity Noted Reaction Type Reactions Mobic [Meloxicam]  12/30/2016    Other (comments) bruising Tylenol [Acetaminophen]  12/02/2015    Rash Current Immunizations  Reviewed on 9/7/2016 Name Date Tdap 7/23/2016 Not reviewed this visit Vitals BP Pulse Temp Height(growth percentile) Weight(growth percentile) SpO2  
 127/84 (BP 1 Location: Left arm, BP Patient Position: Sitting) 91 99.2 °F (37.3 °C) (Oral) 5' 8\" (1.727 m) 185 lb (83.9 kg) 100% BMI Smoking Status 28.13 kg/m2 Former Smoker Vitals History BMI and BSA Data Body Mass Index Body Surface Area  
 28.13 kg/m 2 2.01 m 2 Preferred Pharmacy Pharmacy Name Phone [facility] [phone] [place] [address] [phone] Your Updated Medication List  
  
   
This list is accurate as of: 6/27/17 11:16 AM.  Always use your most recent med list.  
  
  
  
  
 ABILIFY 2 mg tablet Generic drug:  ARIPiprazole Take 2 mg by mouth daily. ADDERALL 30 mg tablet Generic drug:  dextroamphetamine-amphetamine Take 30 mg by mouth two (2) times a day. LaMICtal 150 mg tablet Generic drug:  lamoTRIgine Take  by mouth daily. SUMAtriptan 100 mg tablet Commonly known as:  IMITREX Take 1 Tab by mouth once as needed for Migraine for up to 1 dose. Follow-up Instructions Return for Preventive \"CPE\" 1 yr, labs 1-2 wks prior. Patient Instructions STAY UP TO DATE WITH YOUR PREVENTIVE HEALTH:    
Please review the following recommendations and if you are not sure that your healthcare is up to date, ask your provider at your next scheduled office visit. -- Yearly preventive visits (sometimes called \"physicals\") are recommended for all adults. Medicare and Medicaid do not pay for physicals. Medicare covers a yearly wellness visit that differs in many ways from a traditional physical, but is an opportunity to make sure you are maximizing the preventive services that will be covered by Medicare. Each insurance carrier will have different regulations about what services may be covered, so be sure to talk with your insurance provider before scheduling a visit. -- Colon cancer screening is recommended for all patients 48 and older with either colonoscopy (interval will vary depending on results) or yearly stool testing.   
 
-- Mammogram is recommended every 2 years for women ages 36 to 76. -- Pap smear is recommended every 3-5 years for women aged 24 to 72, unless your cervix has been surgically removed for benign reasons. -- Flu shot is recommended every fall for adults of all ages. -- Prevnar 15 is recommended at the age of 72 for all adults. -- Pneumovax is recommended one year after Prevnar 13 for all adults, but earlier if you have a history of chronic health problems (including diabetes and asthma) or smoking. -- Tetanus boosters are recommended every 10 years for adults of all ages. Medicare and Medicaid do not cover tetanus as a preventive booster, but will pay for patients to receive a booster in the event of certain injuries. INSTRUCTIONS AFTER YOUR VISIT TODAY:  
 
-- Stay off hydroxyzine for now. See if bad nightmares and chest pains are improving in the next 1-2 days. If not, call my office.   We'll probably have you stop propranolol if that's the case and then expect it to be completely out of our system within 24 hours. -- Keep follow up with psychiatry for bipolar and ADHD. TESTING RESULTS  
 
-- Generally lab results can be obtained by logging into your [employer] account. If your insurance requires use of an outside lab facility, your results may not be visible on your [employer] account. If you need assistance with accessing your account, you can call the [address] [place] [address] at #[phone].  
 
-- Lab results may become available for your review on [employer] before your provider has an opportunity to write you a note about results. Review of testing results will generally be done in 10-14 days. Please save your inquiries about testing results until this time frame has passed. -- If you have testing done outside of the office, please call to let us know the date and location that your testing was completed. Results can often be obtained faster if an inquiry is run. MEDICATION REFILLS   
 
-- Controlled substance refills (medications that require printed prescriptions for monitoring of use per [facility] guidelines) must be picked up in the office and per medical group policy will require a scheduled office visit with the provider. Calling the after hours answering service to request a controlled substance represents a violation of [facility] controlled substance policy. -- All other medication refills are to be requested by calling your pharmacy during regular office hours. The after hours physician on call is not required to respond to calls about medication refills. It is your responsibility to keep track of when you may be running out of medication and to place refill requests at least 3 business days prior. [facility] Scheduling appointments can be done at the  or by calling [phone] and selecting option #1.    
 
HOLIDAY CLOSURES:  
 
 The office is closed on six major holidays each year:  [address] Day, July 4th, Memorial Day, Labor Day, Thanksgiving, and Nic Day. Lab and X-ray services are not available on those days. Introducing [facility] & HEALTH SERVICES! Dear Zuleima Werner: 
Thank you for requesting a [employer] account. Our records indicate that you already have an active [employer] account. You can access your account anytime at [email or web address]. [email or web address] Did you know that you can access your hospital and ER discharge instructions at any time in Kuldat? You can also review all of your test results from your hospital stay or ER visit. Additional Information If you have questions, please visit the Frequently Asked Questions section of the Kuldat website at [email or web address] Remember, [employer] is NOT to be used for urgent needs. For medical emergencies, dial 911. Now available from your iPhone and Android! Please provide this summary of care documentation to your next provider. Your primary care clinician is listed as Conner Navas. If you have any questions after today's visit, please call [phone].

## 2017-06-27 NOTE — PROGRESS NOTES
Elidia Comment is a 32 y.o. male  1. Have you been to the ER, urgent care clinic since your last visit? Hospitalized since your last visit? Yes When: 06/24/17 Where: [facility] Reason for visit: chest pain    2. Have you seen or consulted any other health care providers outside of the [address] [place] [address] since your last visit? Include any pap smears or colon screening.  No

## 2017-06-27 NOTE — PATIENT INSTRUCTIONS
STAY UP TO DATE WITH YOUR PREVENTIVE HEALTH:     Please review the following recommendations and if you are not sure that your healthcare is up to date, ask your provider at your next scheduled office visit. -- Yearly preventive visits (sometimes called \"physicals\") are recommended for all adults. Medicare and Medicaid do not pay for physicals. Medicare covers a yearly wellness visit that differs in many ways from a traditional physical, but is an opportunity to make sure you are maximizing the preventive services that will be covered by Medicare. Each insurance carrier will have different regulations about what services may be covered, so be sure to talk with your insurance provider before scheduling a visit. -- Colon cancer screening is recommended for all patients 48 and older with either colonoscopy (interval will vary depending on results) or yearly stool testing.      -- Mammogram is recommended every 2 years for women ages 36 to 76. -- Pap smear is recommended every 3-5 years for women aged 24 to 72, unless your cervix has been surgically removed for benign reasons. -- Flu shot is recommended every fall for adults of all ages. -- Prevnar 15 is recommended at the age of 72 for all adults. -- Pneumovax is recommended one year after Prevnar 13 for all adults, but earlier if you have a history of chronic health problems (including diabetes and asthma) or smoking. -- Tetanus boosters are recommended every 10 years for adults of all ages. Medicare and Medicaid do not cover tetanus as a preventive booster, but will pay for patients to receive a booster in the event of certain injuries. INSTRUCTIONS AFTER YOUR VISIT TODAY:     -- Stay off hydroxyzine for now. See if bad nightmares and chest pains are improving in the next 1-2 days. If not, call my office.   We'll probably have you stop propranolol if that's the case and then expect it to be completely out of our system within 24 hours.      -- Keep follow up with psychiatry for bipolar and ADHD. TESTING RESULTS     -- Generally lab results can be obtained by logging into your [employer] account. If your insurance requires use of an outside lab facility, your results may not be visible on your [employer] account. If you need assistance with accessing your account, you can call the [address] [place] [address] at #[phone].     -- Lab results may become available for your review on [employer] before your provider has an opportunity to write you a note about results. Review of testing results will generally be done in 10-14 days. Please save your inquiries about testing results until this time frame has passed. -- If you have testing done outside of the office, please call to let us know the date and location that your testing was completed. Results can often be obtained faster if an inquiry is run. MEDICATION REFILLS      -- Controlled substance refills (medications that require printed prescriptions for monitoring of use per [facility] guidelines) must be picked up in the office and per medical group policy will require a scheduled office visit with the provider. Calling the after hours answering service to request a controlled substance represents a violation of [facility] controlled substance policy. -- All other medication refills are to be requested by calling your pharmacy during regular office hours. The after hours physician on call is not required to respond to calls about medication refills. It is your responsibility to keep track of when you may be running out of medication and to place refill requests at least 3 business days prior. Efe      Scheduling appointments can be done at the  or by calling [phone] and selecting option #1.       HOLIDAY CLOSURES:     The office is closed on six major holidays each year:  [address] Day, July 4th, Memorial Day, Labor Day, Thanksgiving, and Nic Day. Lab and X-ray services are not available on those days.
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
## 2017-06-27 NOTE — LETTER
NOTIFICATION RETURN TO WORK 
 
6/27/2017 11:17 AM 
 
Mr. Alan Marcial [address] [address] To Whom It May Concern: 
 
Alan Marcial is currently under the care of [address] [place] [address]. He was seen in the office today for an appointment. Please excuse him from any missed work obligations. If there are questions or concerns please have the patient contact our office. Sincerely, Carmen Young MD  
6/27/2017, 11:19 AM 
[facility] [place] [address], [address] [place], [address] \[facility]\""[address] Phone [phone] Fax [phone]

## 2017-07-03 ENCOUNTER — TELEPHONE (OUTPATIENT)
Dept: FAMILY MEDICINE CLINIC | Age: 27
End: 2017-07-03

## 2017-07-03 NOTE — TELEPHONE ENCOUNTER
Patient called and left message stating that he has stopped taking the hydroxyzine and nightmares have stopped however he is still very anxious. Please advise.

## 2017-07-06 ENCOUNTER — PATIENT MESSAGE (OUTPATIENT)
Dept: FAMILY MEDICINE CLINIC | Age: 27
End: 2017-07-06

## 2017-07-06 RX ORDER — PROPRANOLOL HYDROCHLORIDE 10 MG/1
10 TABLET ORAL 3 TIMES DAILY
Status: CANCELLED
Start: 2017-07-06

## 2017-07-06 NOTE — TELEPHONE ENCOUNTER
From: Aidan Ochoa  To: Angelica Mckeon MD  Sent: 7/6/2017 1:40 PM EDT  Subject: Visit Follow-Up Question    I am currently out of propanolol is that something I need to keep taking? It seems to have been working well for me.

## 2017-07-06 NOTE — TELEPHONE ENCOUNTER
Pt was prescribed Inderal 10 mg at the Urgent care that he went and Dr Rigo Sandoval was aware pt taking of the medication. Pt is requesting for a refill because it really helps him a lot. Please let me know if he needs an OV.   Thank you

## 2017-07-07 ENCOUNTER — TELEPHONE (OUTPATIENT)
Dept: FAMILY MEDICINE CLINIC | Age: 27
End: 2017-07-07

## 2017-07-07 NOTE — TELEPHONE ENCOUNTER
Called pt to tell him Dr. Jamie Spencer had refilled his medication. Pt asked if it would be a problem for him to change to 10 mg of propanolol because he had been taking 20 mg. I spoke with Dr. Jamie Spencer and she has not seen this pt before so she was concerned about the 20 mg propanolol tid and the effect on his BP. She wants him to try the 10 mg and if it doesn't work as well he can double up on the 10 mg but he does need BP follow up appt. . We scheduled him an appt with Dr. Janene Luis on 7/20/2017 @ 11:45

## 2017-07-07 NOTE — TELEPHONE ENCOUNTER
Spoke with the pt. Pt stated he and Dr. Tracey Mejias talked about the medication at his last visit and that Dr. Tracey Mejias told him to call back and let her know how it was working. Pt states it has helped so much with his palpitations and he would like to continue taking it. Pt was advised Eliceo Glover was out of the office. Pt stated he had called and left a VM on Eliceo Glover phone? . I have not received a VM from pt. Is there any way you could give pt a refill and we can get him scheduled with Dr. Aimee Lees? Please advise?

## 2017-07-10 ENCOUNTER — IMPORTED ENCOUNTER (OUTPATIENT)
Dept: URBAN - METROPOLITAN AREA CLINIC 1 | Facility: CLINIC | Age: 27
End: 2017-07-10

## 2017-07-10 PROBLEM — H01.004: Noted: 2017-07-10

## 2017-07-10 PROBLEM — H01.001: Noted: 2017-07-10

## 2017-07-10 PROBLEM — H10.45: Noted: 2017-07-10

## 2017-07-10 PROBLEM — H15.101: Noted: 2017-07-10

## 2017-07-10 PROCEDURE — 92004 COMPRE OPH EXAM NEW PT 1/>: CPT

## 2017-07-10 NOTE — PATIENT DISCUSSION
1.  Episcleritis OD - Etiology uncertain. Begin Durezol BID OD (Samples x3 given) till seen. Will do slow taper if improved. 2.  Allergic Conjunctivitis OU - Begin Zaditor BID OU 3. Blepharitis anterior type OU - Begin Daily warm compresses and lid scrubs were recommended. Letter to PCP Return for an appointment in 2 week 10 (Check episcleritis) with Dr. Terrell Rodarte.

## 2017-07-14 NOTE — TELEPHONE ENCOUNTER
Looks like she has Bipolar, a ref by Dr Elizabeth De Luna plced in February !!, she need to see/call psych

## 2017-07-24 ENCOUNTER — IMPORTED ENCOUNTER (OUTPATIENT)
Dept: URBAN - METROPOLITAN AREA CLINIC 1 | Facility: CLINIC | Age: 27
End: 2017-07-24

## 2017-07-24 ENCOUNTER — TELEPHONE (OUTPATIENT)
Dept: FAMILY MEDICINE CLINIC | Age: 27
End: 2017-07-24

## 2017-07-24 PROBLEM — H04.123: Noted: 2017-07-24

## 2017-07-24 PROBLEM — H01.001: Noted: 2017-07-24

## 2017-07-24 PROBLEM — H15.103: Noted: 2017-07-24

## 2017-07-24 PROBLEM — H16.143: Noted: 2017-07-24

## 2017-07-24 PROBLEM — H01.004: Noted: 2017-07-24

## 2017-07-24 PROBLEM — H10.45: Noted: 2017-07-24

## 2017-07-24 PROBLEM — H15.101: Noted: 2017-07-24

## 2017-07-24 PROCEDURE — 92012 INTRM OPH EXAM EST PATIENT: CPT

## 2017-07-24 NOTE — TELEPHONE ENCOUNTER
Pt was seen at Dr. Adriana Oh, ophthalmology on 7/10 for an eye condition (dx: H15.101) and a follow up today for the same dx. Dr. Brina Triana office is called a back dated referral, pt has [facility].   Fax [phone]

## 2017-07-24 NOTE — TELEPHONE ENCOUNTER
Verified with pt's insurance he does not need referral. Left message on Dr. Carrie Velazquez billing dept VM

## 2018-01-03 RX ORDER — PROPRANOLOL HYDROCHLORIDE 10 MG/1
TABLET ORAL
Qty: 90 TAB | Refills: 0 | Status: SHIPPED | OUTPATIENT
Start: 2018-01-03 | End: 2018-01-30 | Stop reason: SDUPTHER

## 2018-01-03 NOTE — TELEPHONE ENCOUNTER
Pt called to request a refill on his medication. He is completely out of his medication today. He is currently schedule for an appt with Dr Dae Rogers on 1/12/18. Please advise with a covering provider since Dr Dae Rogers is out of the office today and tomorrow.

## 2018-01-11 PROBLEM — F41.0 PANIC DISORDER: Status: RESOLVED | Noted: 2017-06-27 | Resolved: 2018-01-11

## 2018-01-11 PROBLEM — Z28.21 INFLUENZA VACCINATION DECLINED BY PATIENT: Status: RESOLVED | Noted: 2017-02-13 | Resolved: 2018-01-11

## 2018-01-11 PROBLEM — F51.5 NIGHTMARE: Status: RESOLVED | Noted: 2017-06-27 | Resolved: 2018-01-11

## 2018-01-12 ENCOUNTER — OFFICE VISIT (OUTPATIENT)
Dept: FAMILY MEDICINE CLINIC | Age: 28
End: 2018-01-12

## 2018-01-12 DIAGNOSIS — Z91.199 NO-SHOW FOR APPOINTMENT: Primary | ICD-10-CM

## 2018-01-12 NOTE — LETTER
NOTIFICATION RETURN TO WORK / SCHOOL 
 
1/12/2018 10:36 AM 
 
Mr. Jenna Leon [address] [address] To Whom It May Concern: 
 
Jenna Leon is currently under the care of [address] [place] [address]. He was in our office on 1/12/2018. Due to a scheduling error we rescheduled his appointment for 1/19/2018. If there are questions or concerns please have the patient contact our office. Sincerely, Vicki Toledo MD

## 2018-01-12 NOTE — PROGRESS NOTES
DOCUMENTATION OF CANCELLED APPOINTMENT. Dione Tovar cancelled his scheduled appointment on 01/12/2018. Same day cancellation. Patient arrived to office today an hour before his scheduled appointment and did not want to wait for scheduled appointment time. [facility] desk staff assisted with rescheduling patient. Future Appointments  Date Time Provider Wendi Yao   1/19/2018 1:00 PM Shannan Delgado  [address]        Encounter Diagnoses     ICD-10-CM ICD-9-CM   1. No-show for appointment/Same day cancellation - 1/12/18 Z53.29 V64.2                PRE-VISIT PLANNING:     Future Appointments  Date Time Provider Wendi Yao   1/12/2018 11:30 AM Shannan Delgado  [address] (PCP is Shannan Delgado MD) is scheduled for an office visit with Shannan Delgado MD on 01/12/2018 for follow up/med refills. Encounter opened prior to visit to complete pre-visit planning, update and review pertinent sections in the chart. Last office visit with PCP was 6/27/17.         Rooming Nurse Items:  -- Flu shot    Pending items for provider to review with patient:  Health Maintenance Due   Topic Date Due    Influenza Age 5 to Adult  08/01/2017

## 2018-02-02 PROBLEM — Z91.199 NO-SHOW FOR APPOINTMENT: Status: RESOLVED | Noted: 2018-01-12 | Resolved: 2018-02-02

## 2018-02-02 RX ORDER — PROPRANOLOL HYDROCHLORIDE 10 MG/1
TABLET ORAL
Qty: 90 TAB | Refills: 0 | Status: SHIPPED | OUTPATIENT
Start: 2018-02-02 | End: 2019-07-11 | Stop reason: ALTCHOICE

## 2018-02-02 NOTE — TELEPHONE ENCOUNTER
Needs to schedule follow up visit for further refills. No show for visit on 1/19/18.  30 day refill provided, no further refills without PCP follow up. Requested Prescriptions     Signed Prescriptions Disp Refills    propranolol (INDERAL) 10 mg tablet 90 Tab 0     Sig: Take 1 tab by mouth 3 times daily. NO FURTHER REFILLS WITHOUT OFFICE FOLLOW UP.      Authorizing Provider: Rubina Schwartz

## 2018-05-23 ENCOUNTER — OFFICE VISIT (OUTPATIENT)
Dept: FAMILY MEDICINE CLINIC | Age: 28
End: 2018-05-23

## 2018-05-23 VITALS
DIASTOLIC BLOOD PRESSURE: 80 MMHG | BODY MASS INDEX: 28.16 KG/M2 | SYSTOLIC BLOOD PRESSURE: 122 MMHG | OXYGEN SATURATION: 99 % | TEMPERATURE: 98.6 F | HEIGHT: 68 IN | RESPIRATION RATE: 16 BRPM | WEIGHT: 185.8 LBS | HEART RATE: 98 BPM

## 2018-05-23 DIAGNOSIS — M79.641 RIGHT HAND PAIN: Primary | ICD-10-CM

## 2018-05-23 RX ORDER — BUSPIRONE HYDROCHLORIDE 10 MG/1
10 TABLET ORAL 3 TIMES DAILY
COMMUNITY
End: 2019-07-11 | Stop reason: ALTCHOICE

## 2018-05-23 RX ORDER — SERTRALINE HYDROCHLORIDE 25 MG/1
50 TABLET, FILM COATED ORAL DAILY
COMMUNITY
End: 2020-12-03

## 2018-05-23 NOTE — LETTER
NOTIFICATION RETURN TO WORK / SCHOOL 
 
5/23/2018 11:46 AM 
 
Mr. Mindi Albert [address] [facility] [address] To Whom It May Concern: 
 
Mindi Albert is currently under the care of [address] [place] [address]. He will return to work/school on: 5/23/2018. If there are questions or concerns please have the patient contact our office. Sincerely, Mathias Romberg, MD

## 2018-05-23 NOTE — MR AVS SNAPSHOT
[address] [place] [address]  [address] [facility] [address] [phone] Patient: Deidre Hernandez MRN: [id number] :1990 Visit Information Date & Time Provider Department Dept. Phone Encounter #  
 2018 11:30 AM Lupillo Vegas [place] [id number] Upcoming Health Maintenance Date Due Influenza Age 5 to Adult 2018 DTaP/Tdap/Td series (2 - Td) 2026 Allergies as of 2018  Review Complete On: 2018 By: Lupillo Vegas MD  
  
 Severity Noted Reaction Type Reactions Mobic [Meloxicam]  2016    Other (comments) bruising Tylenol [Acetaminophen]  2015    Rash Current Immunizations  Reviewed on 2016 Name Date Tdap 2016 Not reviewed this visit You Were Diagnosed With   
  
 Codes Comments Right hand pain    -  Primary ICD-10-CM: L40.107 ICD-9-CM: 729.5 Vitals BP Pulse Temp Resp Height(growth percentile) Weight(growth percentile) 122/80 (BP 1 Location: Left arm, BP Patient Position: Sitting) 98 98.6 °F (37 °C) (Oral) 16 5' 8\" (1.727 m) 185 lb 12.8 oz (84.3 kg) SpO2 BMI Smoking Status 99% 28.25 kg/m2 Former Smoker Vitals History BMI and BSA Data Body Mass Index Body Surface Area  
 28.25 kg/m 2 2.01 m 2 Preferred Pharmacy Pharmacy Name Phone [facility] [address] [place] [address] [phone] Your Updated Medication List  
  
   
This list is accurate as of 18 11:42 AM.  Always use your most recent med list.  
  
  
  
  
 ABILIFY 2 mg tablet Generic drug:  ARIPiprazole Take 2 mg by mouth daily. ADDERALL 30 mg tablet Generic drug:  dextroamphetamine-amphetamine Take 30 mg by mouth two (2) times a day. busPIRone 10 mg tablet Commonly known as:  BUSPAR Take 10 mg by mouth three (3) times daily. LaMICtal 150 mg tablet Generic drug:  lamoTRIgine Take  by mouth daily. propranolol 10 mg tablet Commonly known as:  INDERAL Take 1 tab by mouth 3 times daily. NO FURTHER REFILLS WITHOUT OFFICE FOLLOW UP. ZOLOFT 25 mg tablet Generic drug:  sertraline Take  by mouth daily. To-Do List   
 05/23/2018 Imaging:  XR HAND RT MIN 3 V Patient Instructions Return for x-ray Will self refer to preferred orthopaedist 
 
 
 
  
Introducing [facility]! Dear Primitivo Thomas: 
Thank you for requesting a HomeCon account. Our records indicate that you already have an active HomeCon account. You can access your account anytime at [email or web address] Did you know that you can access your hospital and ER discharge instructions at any time in HomeCon? You can also review all of your test results from your hospital stay or ER visit. Additional Information If you have questions, please visit the Frequently Asked Questions section of the HomeCon website at [email or web address] Remember, HomeCon is NOT to be used for urgent needs. For medical emergencies, dial 911. Now available from your iPhone and Android! Please provide this summary of care documentation to your next provider. Your primary care clinician is listed as Fitz Aguilar. If you have any questions after today's visit, please call [phone].

## 2018-05-23 NOTE — PROGRESS NOTES
Levell Ours is a 32 y.o. male here for finger pain     Levell Ours is a 32 y.o. male (: 1990) presenting to address:    Chief Complaint   Patient presents with    Finger Swelling     pt states for about 2-3 weeks  he's had R pinky finger/ hand pain. Vitals:    18 1130   BP: 122/80   Pulse: 98   Resp: 16   SpO2: 99%   Weight: 185 lb 12.8 oz (84.3 kg)   Height: 5' 8\" (1.727 m)   PainSc:   8   PainLoc: Hand       Hearing/Vision:   No exam data present    Learning Assessment:     Learning Assessment 2015   PRIMARY LEARNER Patient   HIGHEST LEVEL OF EDUCATION - PRIMARY LEARNER  2 YEARS OF COLLEGE   BARRIERS PRIMARY LEARNER NONE   CO-LEARNER CAREGIVER No   PRIMARY LANGUAGE ENGLISH    NEED No   LEARNER PREFERENCE PRIMARY READING   LEARNING SPECIAL TOPICS follow up   ANSWERED BY patient   RELATIONSHIP SELF   ASSESSMENT COMMENT n/a     Depression Screening:     PHQ over the last two weeks 2014   Little interest or pleasure in doing things Not at all   Feeling down, depressed or hopeless Not at all   Total Score PHQ 2 0     Fall Risk Assessment:   No flowsheet data found. Abuse Screening:   No flowsheet data found. Coordination of Care Questionaire:   1. Have you been to the ER, urgent care clinic since your last visit? Hospitalized since your last visit? NO    2. Have you seen or consulted any other health care providers outside of the [address] [place] [address] since your last visit? Include any pap smears or colon screening. NO    Advanced Directive:   1. Do you have an Advanced Directive? NO    2. Would you like information on Advanced Directives?  NO

## 2018-05-23 NOTE — PROGRESS NOTES
HISTORY OF PRESENT ILLNESS  Paresh Maradiaga is a 32 y.o. male. Finger Swelling   The history is provided by the patient and medical records. This is a new problem. Episode onset: 2-3 weeks ago. Patient Active Problem List   Diagnosis Code    ADHD (attention deficit hyperactivity disorder) F90.9    Migraine G43.909    ADD (attention deficit disorder) F98.8    Seasonal allergic rhinitis J30.2    Palpitations R00.2    B[facility] affective disorder ([facility] F31.9    Chronic upper back pain M54.9, G89.29    Chronic lower back pain M54.5, G89.29    Bulge of cervical disc without myelopathy M50.20    Bulge of lumbar disc without myelopathy M51.26    Ocular migraine with aura (dry mouth, nausea and sound sensitivity) G43.109    Chest pain at rest R07.9       Current Outpatient Prescriptions:     busPIRone (BUSPAR) 10 mg tablet, Take 10 mg by mouth three (3) times daily. , Disp: , Rfl:     sertraline (ZOLOFT) 25 mg tablet, Take  by mouth daily. , Disp: , Rfl:     propranolol (INDERAL) 10 mg tablet, Take 1 tab by mouth 3 times daily. NO FURTHER REFILLS WITHOUT OFFICE FOLLOW UP., Disp: 90 Tab, Rfl: 0    dextroamphetamine-amphetamine (ADDERALL) 30 mg tablet, Take 30 mg by mouth two (2) times a day., Disp: , Rfl:     lamoTRIgine (LAMICTAL) 150 mg tablet, Take  by mouth daily. , Disp: , Rfl:     ARIPiprazole (ABILIFY) 2 mg tablet, Take 2 mg by mouth daily. , Disp: , Rfl:     Allergies   Allergen Reactions    Mobic [Meloxicam] Other (comments)     bruising    Tylenol [Acetaminophen] Rash       Review of Systems   Musculoskeletal:        Pain right 5th MCP joint after punching a wall  (reports working out with a punching bag, missed and hit the wall several months ago and again a few weeks ago)   Neurological: Positive for tingling (ulnar right hand, forearm). Negative for focal weakness.      Visit Vitals    /80 (BP 1 Location: Left arm, BP Patient Position: Sitting)    Pulse 98    Temp 98.6 °F (37 °C) (Oral)    Resp 16    Ht 5' 8\" (1.727 m)    Wt 185 lb 12.8 oz (84.3 kg)    SpO2 99%    BMI 28.25 kg/m2       Physical Exam   Constitutional: He is oriented to person, place, and time. He appears well-developed and well-nourished. HENT:   Head: Normocephalic. Eyes: EOM are normal.   Cardiovascular: Normal rate. Musculoskeletal: He exhibits edema (minimal, right 5th MCP joint) and tenderness (ulnar right hand, greatest at distal 5th metacarpal). He exhibits no deformity (no obvious deformity of the hand). Neurological: He is alert and oriented to person, place, and time. Psychiatric: He has a normal mood and affect. His behavior is normal.   Nursing note and vitals reviewed. ASSESSMENT and PLAN    ICD-10-CM ICD-9-CM    1. Right hand pain M79.641 729.5 XR HAND RT MIN 3 V   Return for x-ray, further disposition pending x-ray results if indicated.     Will self refer to preferred orthopaedist

## 2018-05-24 ENCOUNTER — OFFICE VISIT (OUTPATIENT)
Dept: FAMILY MEDICINE CLINIC | Age: 28
End: 2018-05-24

## 2018-05-24 VITALS
TEMPERATURE: 98.6 F | HEIGHT: 68 IN | OXYGEN SATURATION: 98 % | DIASTOLIC BLOOD PRESSURE: 76 MMHG | WEIGHT: 185 LBS | SYSTOLIC BLOOD PRESSURE: 131 MMHG | BODY MASS INDEX: 28.04 KG/M2 | HEART RATE: 90 BPM | RESPIRATION RATE: 18 BRPM

## 2018-05-24 DIAGNOSIS — S60.221D CONTUSION OF RIGHT HAND, SUBSEQUENT ENCOUNTER: Primary | ICD-10-CM

## 2018-05-24 NOTE — PROGRESS NOTES
See previous note. Returns after x-ray, no Fx seen  - radiology interpretation pending, reports that his orthopaedist has left the practice. Right hand pain is not improving. Unable to tolerate NSAIDs. Impression: contusion right hand  Plan: Apply warm wet compresses or soak in warm water frequently, avoid painful activity, take OTC  acetaminophen as needed.  Sports Medicine referral

## 2018-05-24 NOTE — MR AVS SNAPSHOT
[address]  [address] [facility] [address] [phone] Patient: Sabra Sanchez MRN: [id number] :1990 Visit Information Date & Time Provider Department Dept. Phone Encounter #  
 2018  8:45 AM Niya Narvaez, [place] [facility] [phone] [id number] Upcoming Health Maintenance Date Due Influenza Age 5 to Adult 2018 DTaP/Tdap/Td series (2 - Td) 2026 Allergies as of 2018  Review Complete On: 2018 By: Niya Narvaez MD  
  
 Severity Noted Reaction Type Reactions Mobic [Meloxicam]  2016    Other (comments) bruising Tylenol [Acetaminophen]  2015    Rash Current Immunizations  Reviewed on 2016 Name Date Tdap 2016 Not reviewed this visit You Were Diagnosed With   
  
 Codes Comments Contusion of right hand, subsequent encounter    -  Primary ICD-10-CM: M31.370M ICD-9-CM: V58.89, 923.20 Vitals BP Pulse Temp Resp Height(growth percentile) Weight(growth percentile) 131/76 (BP 1 Location: Left arm, BP Patient Position: Sitting) 90 98.6 °F (37 °C) (Oral) 18 5' 8\" (1.727 m) 185 lb (83.9 kg) SpO2 BMI Smoking Status 98% 28.13 kg/m2 Former Smoker BMI and BSA Data Body Mass Index Body Surface Area  
 28.13 kg/m 2 2.01 m 2 Preferred Pharmacy Pharmacy Name Phone [facility] [address] [place] [address] [phone] Your Updated Medication List  
  
   
This list is accurate as of 18  8:55 AM.  Always use your most recent med list.  
  
  
  
  
 ABILIFY 2 mg tablet Generic drug:  ARIPiprazole Take 2 mg by mouth daily. ADDERALL 30 mg tablet Generic drug:  dextroamphetamine-amphetamine Take 30 mg by mouth two (2) times a day. busPIRone 10 mg tablet Commonly known as:  BUSPAR Take 10 mg by mouth three (3) times daily. LaMICtal 150 mg tablet Generic drug:  lamoTRIgine Take  by mouth daily. propranolol 10 mg tablet Commonly known as:  INDERAL Take 1 tab by mouth 3 times daily. NO FURTHER REFILLS WITHOUT OFFICE FOLLOW UP. ZOLOFT 25 mg tablet Generic drug:  sertraline Take  by mouth daily. We Performed the Following REFERRAL TO SPORTS MEDICINE [MGU753 Custom] Comments:  
 31 yo male, works out with a punching bag, sometimes misses and hits the wall-most recently about a month ago but pain persists without improvement, No osseous injury seen on x-ray. (patient of Dr. Jonathan Glasgow) Referral Information Referral ID Referred By Referred To  
  
 [id number] [place] [address] [address] [place] [address] Riccardo Ordonez, [address] [place] [address] Phone: [phone] Fax: [phone] Visits Status Start Date End Date 1 New Request 5/24/18 5/24/19 If your referral has a status of pending review or denied, additional information will be sent to support the outcome of this decision. Patient Instructions Apply warm wet compresses or soak in warm water frequently, avoid painful activity, take OTC  acetaminophen as needed. Sports Medicine referral  
 
  
Hand Bruises: Care Instructions Your Care Instructions Bruises, or contusions, can happen as a result of an impact or fall. Most people think of a bruise as a black-and-blue spot. This happens when small blood vessels get torn and leak blood under the skin. The bruise may turn purplish black, reddish blue, or yellowish green as it heals. But bones and muscles can also get bruised. This may damage the hand but not cause a bruise that you can see. Most bruises aren't serious and will go away on their own in 2 to 4 weeks. But sometimes a more serious hand injury might not heal on its own.  Tell your doctor if you have new symptoms or your injury is not getting better over time. You may have tests to see if you have bone or nerve damage. These tests may include X-rays, a CT scan, or an MRI. If you damaged bones or muscles, you may need more treatment. The doctor has checked you carefully, but problems can develop later. If you notice any problems or new symptoms, get medical treatment right away. Follow-up care is a key part of your treatment and safety. Be sure to make and go to all appointments, and call your doctor if you are having problems. It's also a good idea to know your test results and keep a list of the medicines you take. How can you care for yourself at home? · Put ice or a cold pack on the hand for 10 to 20 minutes at a time. Put a thin cloth between the ice and your skin. · Prop up your hand on a pillow when you ice it or anytime you sit or lie down during the next 3 days. Try to keep your hand above the level of your heart. This will help reduce swelling. · Be safe with medicines. Read and follow all instructions on the label. ¨ If the doctor gave you a prescription medicine for pain, take it as prescribed. ¨ If you are not taking a prescription pain medicine, ask your doctor if you can take an over-the-counter medicine. · Be sure to follow your doctor's advice about moving and exercising your injured hand. When should you call for help? Call your doctor now or seek immediate medical care if: 
? · Your pain gets worse. ? · You have new or worse swelling. ? · You have tingling, weakness, or numbness in the area near the bruise. ? · The area near the bruise is cold or pale. ? · You have symptoms of infection, such as: 
¨ Increased pain, swelling, warmth, or redness. ¨ Red streaks leading from the area. ¨ Pus draining from the area. ¨ A fever. ? Watch closely for changes in your health, and be sure to contact your doctor if: 
? · You do not get better as expected. Where can you learn more? Go to http://edwardo-marques.info/. Enter N044 in the search box to learn more about \"Hand Bruises: Care Instructions. \" Current as of: March 20, 2017 Content Version: 11.4 © 3240-3623 Register My InfoÂ®. Care instructions adapted under license by [facility] (which disclaims liability or warranty for this information). If you have questions about a medical condition or this instruction, always ask your healthcare professional. [address] any warranty or liability for your use of this information. Introducing [facility]! Dear Jw Lynn: 
Thank you for requesting a SchoolControl account. Our records indicate that you already have an active SchoolControl account. You can access your account anytime at [email or web address] Did you know that you can access your hospital and ER discharge instructions at any time in SchoolControl? You can also review all of your test results from your hospital stay or ER visit. Additional Information If you have questions, please visit the Frequently Asked Questions section of the SchoolControl website at [email or web address] Remember, SchoolControl is NOT to be used for urgent needs. For medical emergencies, dial 911. Now available from your iPhone and Android! Please provide this summary of care documentation to your next provider. Your primary care clinician is listed as Nehemiah Landrum. If you have any questions after today's visit, please call [phone].

## 2018-05-24 NOTE — PROGRESS NOTES
Estefanía Gama is a 32 y.o. male here for follow up     . Estefanía Gama is a 32 y.o. male (: 1990) presenting to address:    Chief Complaint   Patient presents with    Finger Swelling     pt here for follow up. x rays done       Vitals:    18 0844   BP: 131/76   Pulse: 90   Resp: 18   Temp: 98.6 °F (37 °C)   TempSrc: Oral   SpO2: 98%   Weight: 185 lb (83.9 kg)   Height: 5' 8\" (1.727 m)   PainSc:   7   PainLoc: Finger       Hearing/Vision:   No exam data present    Learning Assessment:     Learning Assessment 2015   PRIMARY LEARNER Patient   HIGHEST LEVEL OF EDUCATION - PRIMARY LEARNER  2 YEARS OF COLLEGE   BARRIERS PRIMARY LEARNER NONE   CO-LEARNER CAREGIVER No   PRIMARY LANGUAGE ENGLISH    NEED No   LEARNER PREFERENCE PRIMARY READING   LEARNING SPECIAL TOPICS follow up   ANSWERED BY patient   RELATIONSHIP SELF   ASSESSMENT COMMENT n/a     Depression Screening:     PHQ over the last two weeks 2014   Little interest or pleasure in doing things Not at all   Feeling down, depressed or hopeless Not at all   Total Score PHQ 2 0     Fall Risk Assessment:   No flowsheet data found. Abuse Screening:   No flowsheet data found. Coordination of Care Questionaire:   1. Have you been to the ER, urgent care clinic since your last visit? Hospitalized since your last visit? NO    2. Have you seen or consulted any other health care providers outside of the [address] [place] [address] since your last visit? Include any pap smears or colon screening. NO    Advanced Directive:   1. Do you have an Advanced Directive? NO    2. Would you like information on Advanced Directives?  NO

## 2018-05-24 NOTE — PATIENT INSTRUCTIONS
Apply warm wet compresses or soak in warm water frequently, avoid painful activity, take OTC  acetaminophen as needed. Sports Medicine referral        Hand Bruises: Care Instructions  Your Care Instructions  Bruises, or contusions, can happen as a result of an impact or fall. Most people think of a bruise as a black-and-blue spot. This happens when small blood vessels get torn and leak blood under the skin. The bruise may turn purplish black, reddish blue, or yellowish green as it heals. But bones and muscles can also get bruised. This may damage the hand but not cause a bruise that you can see. Most bruises aren't serious and will go away on their own in 2 to 4 weeks. But sometimes a more serious hand injury might not heal on its own. Tell your doctor if you have new symptoms or your injury is not getting better over time. You may have tests to see if you have bone or nerve damage. These tests may include X-rays, a CT scan, or an MRI. If you damaged bones or muscles, you may need more treatment. The doctor has checked you carefully, but problems can develop later. If you notice any problems or new symptoms, get medical treatment right away. Follow-up care is a key part of your treatment and safety. Be sure to make and go to all appointments, and call your doctor if you are having problems. It's also a good idea to know your test results and keep a list of the medicines you take. How can you care for yourself at home? · Put ice or a cold pack on the hand for 10 to 20 minutes at a time. Put a thin cloth between the ice and your skin. · Prop up your hand on a pillow when you ice it or anytime you sit or lie down during the next 3 days. Try to keep your hand above the level of your heart. This will help reduce swelling. · Be safe with medicines. Read and follow all instructions on the label. ¨ If the doctor gave you a prescription medicine for pain, take it as prescribed.   ¨ If you are not taking a prescription pain medicine, ask your doctor if you can take an over-the-counter medicine. · Be sure to follow your doctor's advice about moving and exercising your injured hand. When should you call for help? Call your doctor now or seek immediate medical care if:  ? · Your pain gets worse. ? · You have new or worse swelling. ? · You have tingling, weakness, or numbness in the area near the bruise. ? · The area near the bruise is cold or pale. ? · You have symptoms of infection, such as:  ¨ Increased pain, swelling, warmth, or redness. ¨ Red streaks leading from the area. ¨ Pus draining from the area. ¨ A fever. ? Watch closely for changes in your health, and be sure to contact your doctor if:  ? · You do not get better as expected. Where can you learn more? Go to http://edwardo-marques.info/. Enter L047 in the search box to learn more about \"Hand Bruises: Care Instructions. \"  Current as of: March 20, 2017  Content Version: 11.4  © 8966-2971 [employer]. Care instructions adapted under license by [employer] (which disclaims liability or warranty for this information). If you have questions about a medical condition or this instruction, always ask your healthcare professional. [address] any warranty or liability for your use of this information.

## 2018-11-19 ENCOUNTER — IMPORTED ENCOUNTER (OUTPATIENT)
Dept: URBAN - METROPOLITAN AREA CLINIC 1 | Facility: CLINIC | Age: 28
End: 2018-11-19

## 2018-11-19 PROBLEM — H01.004: Noted: 2018-11-19

## 2018-11-19 PROBLEM — H01.001: Noted: 2018-11-19

## 2018-11-19 PROBLEM — H00.15: Noted: 2018-11-19

## 2018-11-19 PROCEDURE — 92012 INTRM OPH EXAM EST PATIENT: CPT

## 2018-11-19 NOTE — PATIENT DISCUSSION
1.  Chalazion LLL with secondary cellulitis - Begin Hot compresses TID x 5 minutes for 3 weeks. If without improvement discussed with patient possible Incision and Drainage procedure. Risks and benefits discussed with patient and patient states full understanding. Begin Doxycycline 2. Anterior Blepharitis OU - Begin Daily Hot compresses and lid scrubs were recommended. 3. Episcleritis OD- inactive. 4.  TAMMY w/ PEK OU- Use ATs TID OU Routinely. 5.  Allergic Conjunctivitis OU- Continue Zaditor BID OU 6. H/o CL Wear Return for an appointment in 2-3 week 10 with Dr. Valarie Del Angel.

## 2019-07-11 ENCOUNTER — OFFICE VISIT (OUTPATIENT)
Dept: FAMILY MEDICINE CLINIC | Age: 29
End: 2019-07-11

## 2019-07-11 VITALS
DIASTOLIC BLOOD PRESSURE: 80 MMHG | SYSTOLIC BLOOD PRESSURE: 125 MMHG | BODY MASS INDEX: 25.31 KG/M2 | RESPIRATION RATE: 16 BRPM | HEIGHT: 68 IN | HEART RATE: 71 BPM | OXYGEN SATURATION: 96 % | TEMPERATURE: 98.5 F | WEIGHT: 167 LBS

## 2019-07-11 DIAGNOSIS — Z13.220 SCREENING, LIPID: ICD-10-CM

## 2019-07-11 DIAGNOSIS — Z13.29 SCREENING FOR THYROID DISORDER: ICD-10-CM

## 2019-07-11 DIAGNOSIS — G43.109 OCULAR MIGRAINE: Chronic | ICD-10-CM

## 2019-07-11 DIAGNOSIS — E55.9 VITAMIN D DEFICIENCY: ICD-10-CM

## 2019-07-11 DIAGNOSIS — Z79.899 HIGH RISK MEDICATION USE: Primary | ICD-10-CM

## 2019-07-11 DIAGNOSIS — Z76.89 ENCOUNTER TO ESTABLISH CARE: ICD-10-CM

## 2019-07-11 DIAGNOSIS — Z20.2 STD EXPOSURE: ICD-10-CM

## 2019-07-11 RX ORDER — PROPRANOLOL HYDROCHLORIDE 80 MG/1
CAPSULE, EXTENDED RELEASE ORAL DAILY
COMMUNITY
End: 2019-07-31 | Stop reason: SDUPTHER

## 2019-07-11 RX ORDER — SUMATRIPTAN 100 MG/1
100 TABLET, FILM COATED ORAL
Qty: 12 TAB | Refills: 11 | Status: SHIPPED | OUTPATIENT
Start: 2019-07-11 | End: 2019-07-11

## 2019-07-11 RX ORDER — MULTIVIT WITH MINERALS/HERBS
1 TABLET ORAL DAILY
COMMUNITY

## 2019-07-11 RX ORDER — LANOLIN ALCOHOL/MO/W.PET/CERES
6 CREAM (GRAM) TOPICAL
COMMUNITY
End: 2020-12-03
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
## 2019-07-11 NOTE — LETTER
7/11/2019 4:46 PM 
 
Mr. Law Courtney [place] [id number] [facility] [id number] To Whom It May Concern: 
 
Law Courtney is currently under the care of [address] [place] [address]. Mr. Aquiles Workman EKG was normal today. He is safe to discontinue propranolol. Recommend slow taper as patient has been on this medication for some time. Discontinuing medication abruptly can cause headaches and tachycardia and patient does have a history of migraine headache. Please call me if you have any thoughts or concerns. If there are questions or concerns please have the patient contact our office.  
 
 
 
Sincerely, 
 
 
Dave Murray PA-C

## 2019-07-11 NOTE — PROGRESS NOTES
Chief Complaint   Patient presents with   [facility]     1. Have you been to the ER, urgent care clinic since your last visit? Hospitalized since your last visit? [facility]-anxiety attacks    2. Have you seen or consulted any other health care providers outside of the [address] [place] [address] since your last visit? Include any pap smears or colon screening.  [address] [place] [address]

## 2019-07-12 ENCOUNTER — LAB ONLY (OUTPATIENT)
Dept: FAMILY MEDICINE CLINIC | Age: 29
End: 2019-07-12

## 2019-07-12 ENCOUNTER — HOSPITAL ENCOUNTER (OUTPATIENT)
Dept: LAB | Age: 29
Discharge: HOME | End: 2019-07-12
Payer: COMMERCIAL

## 2019-07-12 DIAGNOSIS — Z01.89 ROUTINE LAB DRAW: Primary | ICD-10-CM

## 2019-07-12 DIAGNOSIS — Z20.2 STD EXPOSURE: ICD-10-CM

## 2019-07-12 DIAGNOSIS — Z76.89 ENCOUNTER TO ESTABLISH CARE: ICD-10-CM

## 2019-07-12 DIAGNOSIS — Z13.220 SCREENING, LIPID: ICD-10-CM

## 2019-07-12 DIAGNOSIS — Z13.29 SCREENING FOR THYROID DISORDER: ICD-10-CM

## 2019-07-12 DIAGNOSIS — E55.9 VITAMIN D DEFICIENCY: ICD-10-CM

## 2019-07-12 LAB
ALBUMIN SERPL-MCNC: 4 G/DL (ref 3.4–5)
ALBUMIN/GLOB SERPL: 1.3 {RATIO} (ref 0.8–1.7)
ALP SERPL-CCNC: 71 U/L (ref 45–117)
ALT SERPL-CCNC: 24 U/L (ref 16–61)
ANION GAP SERPL CALC-SCNC: 6 MMOL/L (ref 3–18)
AST SERPL-CCNC: 30 U/L (ref 15–37)
BASOPHILS # BLD: 0 K/UL (ref 0–0.1)
BASOPHILS NFR BLD: 0 % (ref 0–2)
BILIRUB SERPL-MCNC: 0.4 MG/DL (ref 0.2–1)
BUN SERPL-MCNC: 18 MG/DL (ref 7–18)
BUN/CREAT SERPL: 20 (ref 12–20)
CALCIUM SERPL-MCNC: 9.2 MG/DL (ref 8.5–10.1)
CHLORIDE SERPL-SCNC: 106 MMOL/L (ref 100–108)
CHOLEST SERPL-MCNC: 147 MG/DL
CO2 SERPL-SCNC: 28 MMOL/L (ref 21–32)
CREAT SERPL-MCNC: 0.91 MG/DL (ref 0.6–1.3)
DIFFERENTIAL METHOD BLD: ABNORMAL
EOSINOPHIL # BLD: 0.1 K/UL (ref 0–0.4)
EOSINOPHIL NFR BLD: 1 % (ref 0–5)
ERYTHROCYTE [DISTWIDTH] IN BLOOD BY AUTOMATED COUNT: 14 % (ref 11.6–14.5)
GLOBULIN SER CALC-MCNC: 3 G/DL (ref 2–4)
GLUCOSE SERPL-MCNC: 68 MG/DL (ref 74–99)
HCT VFR BLD AUTO: 39.5 % (ref 36–48)
HDLC SERPL-MCNC: 71 MG/DL (ref 40–60)
HDLC SERPL: 2.1 {RATIO} (ref 0–5)
HGB BLD-MCNC: 12.3 G/DL (ref 13–16)
LDLC SERPL CALC-MCNC: 64.2 MG/DL (ref 0–100)
LIPID PROFILE,FLP: ABNORMAL
LYMPHOCYTES # BLD: 1.3 K/UL (ref 0.9–3.6)
LYMPHOCYTES NFR BLD: 16 % (ref 21–52)
MCH RBC QN AUTO: 28.5 PG (ref 24–34)
MCHC RBC AUTO-ENTMCNC: 31.1 G/DL (ref 31–37)
MCV RBC AUTO: 91.4 FL (ref 74–97)
MONOCYTES # BLD: 0.5 K/UL (ref 0.05–1.2)
MONOCYTES NFR BLD: 6 % (ref 3–10)
NEUTS SEG # BLD: 6.3 K/UL (ref 1.8–8)
NEUTS SEG NFR BLD: 77 % (ref 40–73)
PLATELET # BLD AUTO: 250 K/UL (ref 135–420)
PMV BLD AUTO: 11.2 FL (ref 9.2–11.8)
POTASSIUM SERPL-SCNC: 4.2 MMOL/L (ref 3.5–5.5)
PROT SERPL-MCNC: 7 G/DL (ref 6.4–8.2)
RBC # BLD AUTO: 4.32 M/UL (ref 4.7–5.5)
SODIUM SERPL-SCNC: 140 MMOL/L (ref 136–145)
T PALLIDUM AB SER QL IA: NONREACTIVE
T4 FREE SERPL-MCNC: 1.3 NG/DL (ref 0.7–1.5)
TRIGL SERPL-MCNC: 59 MG/DL (ref ?–150)
TSH SERPL DL<=0.05 MIU/L-ACNC: 0.62 UIU/ML (ref 0.36–3.74)
VLDLC SERPL CALC-MCNC: 11.8 MG/DL
WBC # BLD AUTO: 8.1 K/UL (ref 4.6–13.2)

## 2019-07-12 PROCEDURE — 86780 TREPONEMA PALLIDUM: CPT

## 2019-07-12 PROCEDURE — 80053 COMPREHEN METABOLIC PANEL: CPT

## 2019-07-12 PROCEDURE — 86803 HEPATITIS C AB TEST: CPT

## 2019-07-12 PROCEDURE — 84443 ASSAY THYROID STIM HORMONE: CPT

## 2019-07-12 PROCEDURE — 87340 HEPATITIS B SURFACE AG IA: CPT

## 2019-07-12 PROCEDURE — 82306 VITAMIN D 25 HYDROXY: CPT

## 2019-07-12 PROCEDURE — 84439 ASSAY OF FREE THYROXINE: CPT

## 2019-07-12 PROCEDURE — 80061 LIPID PANEL: CPT

## 2019-07-12 PROCEDURE — 85025 COMPLETE CBC W/AUTO DIFF WBC: CPT

## 2019-07-12 PROCEDURE — 87389 HIV-1 AG W/HIV-1&-2 AB AG IA: CPT

## 2019-07-12 PROCEDURE — 87491 CHLMYD TRACH DNA AMP PROBE: CPT

## 2019-07-12 NOTE — PROGRESS NOTES
Patient presents for lab draw ordered by:    Ordering Provider:  [facility] Department/Practice:  [facility]  Phone:  [phone]  Date Ordered:  7/2019    The following labs were drawn and sent to [employer] [facility]  by Valarie Henderson LPN:    All labs ordered on 7/2019    The following tubes were sent:     5 SST, 2Lav, 0Blue top, 1urine    Left AC cleansed using aseptic technique. Specimen obtained using a 23 gauge butterfly  with 1 attempt. Patient tolerated process well and there was no bleeding noted at site.

## 2019-07-13 LAB — 25(OH)D3 SERPL-MCNC: 31.4 NG/ML (ref 30–100)

## 2019-07-15 LAB
HBV SURFACE AG SER QL: 0.46 INDEX
HBV SURFACE AG SER QL: NEGATIVE
HCV AB SER IA-ACNC: 0.07 INDEX
HCV AB SERPL QL IA: NEGATIVE
HCV COMMENT,HCGAC: NORMAL
HIV 1+2 AB+HIV1 P24 AG SERPL QL IA: NONREACTIVE
HIV12 RESULT COMMENT, HHIVC: NORMAL

## 2019-07-16 LAB
C TRACH RRNA SPEC QL NAA+PROBE: NEGATIVE
N GONORRHOEA RRNA SPEC QL NAA+PROBE: NEGATIVE
SPECIMEN SOURCE: NORMAL

## 2019-07-31 ENCOUNTER — OFFICE VISIT (OUTPATIENT)
Dept: FAMILY MEDICINE CLINIC | Age: 29
End: 2019-07-31

## 2019-07-31 VITALS
SYSTOLIC BLOOD PRESSURE: 116 MMHG | RESPIRATION RATE: 16 BRPM | WEIGHT: 169 LBS | BODY MASS INDEX: 25.61 KG/M2 | TEMPERATURE: 97.4 F | OXYGEN SATURATION: 99 % | DIASTOLIC BLOOD PRESSURE: 78 MMHG | HEART RATE: 97 BPM | HEIGHT: 68 IN

## 2019-07-31 DIAGNOSIS — D64.9 ANEMIA, UNSPECIFIED TYPE: Primary | ICD-10-CM

## 2019-07-31 DIAGNOSIS — R00.2 PALPITATIONS: Chronic | ICD-10-CM

## 2019-07-31 DIAGNOSIS — F31.9 BIPOLAR AFFECTIVE DISORDER, REMISSION STATUS UNSPECIFIED (HCC): Chronic | ICD-10-CM

## 2019-07-31 DIAGNOSIS — B35.0 TINEA CAPITIS: ICD-10-CM

## 2019-07-31 RX ORDER — PROPRANOLOL HYDROCHLORIDE 80 MG/1
80 CAPSULE, EXTENDED RELEASE ORAL DAILY
Qty: 90 CAP | Refills: 0 | Status: SHIPPED | OUTPATIENT
Start: 2019-07-31 | End: 2019-10-22 | Stop reason: SDUPTHER

## 2019-07-31 NOTE — PROGRESS NOTES
SUBJECTIVE:   Yany Montgomery is a 29 y.o. male presenting for his annual checkup. HPI:  Patient here today for PE. Patient has a few concerns today that he would like addressed. He states that he has had an ongoing rash and scaling of his scalp. Patient has used shampoo on his scalp in the past with some improvement. Patient also states that he has had lymph node swelling located on his posterior head. Patient states that swelling of lymph node is intermittent and it occurred last week. Patient states that lymph node is still swollen, but has decreased in size. He endorses some headache associated with lymph node swelling last week, but otherwise no illness associated. Denies headache, lightheadedness, dizziness, vision changes, chest pain at rest or with exertion, rapid/irregular heart rate, shortness of breath at rest or with exertion, cough, abdominal pain, leg swelling, leg pain. Recent abnormal labs:  Very mild anema as shown below  WBC 8.1   4.6 - 13.2 K/uL Final     RBC 4.32  Low   4.70 - 5.50 M/uL Final   HGB 12.3  Low   13.0 - 16.0 g/dL Final   HCT 39.5   36.0 - 48.0 % Final   MCV 91.4   74.0 - 97.0 FL Final   MCH 28.5   24.0 - 34.0 PG Final   MCHC 31.1   31.0 - 37.0 g/dL Final   RDW 14.0   11.6 - 14.5 % Final   PLATELET 377   756 - 420 K/uL Final   MPV 11.2   9.2 - 11.8 FL Final   NEUTROPHILS 77  High   40 - 73 % Final   LYMPHOCYTES 16  Low   21 - 52 % Fin     Patient denies any rectal bleeding, dark stools, BRBPR, hemorrhoids. Current Outpatient Medications   Medication Sig Dispense Refill    propranolol LA (INDERAL LA) 80 mg SR capsule Take  by mouth daily.  b complex vitamins (B COMPLEX 1) tablet Take 1 Tab by mouth daily.  melatonin 3 mg tablet Take 6 mg by mouth.  sertraline (ZOLOFT) 25 mg tablet Take 50 mg by mouth daily. Allergies: Mobic [meloxicam]; Nsaids (non-steroidal anti-inflammatory drug);  Percocet [oxycodone-acetaminophen]; and Tylenol [acetaminophen]     ROS:  Feeling well. No dyspnea or chest pain on exertion. No abdominal pain, change in bowel habits, black or bloody stools. No urinary tract or prostatic symptoms. No neurological complaints. OBJECTIVE:   There were no vitals taken for this visit. There is no height or weight on file to calculate BMI. Gen: The patient appears well, alert, oriented x 3, in no distress. ENT:External ears and TM normal bilat. Neck supple. No adenopathy or thyromegaly. Eyes: PERRLA. Conjunctiva normal.  Pulm: Lungs are clear, good air entry, no wheezes, rhonchi or rales. CV: S1 and S2 normal, no murmurs, regular rate and rhythm. show no edema, normal peripheral pulses. GI: Abdomen is soft, nontender. no guarding, mass or organomegaly.  exam: deferred. MS: No joint deformity or tenderness. Neuro: No focal sensory or motor deficits. Lymph node-1 posterior occipital lymph node, approximately 1cm, +ttp  Skin- +dryness, scaling across entire scalp and into beard area      ASSESSMENT/PLAN:   Diagnoses and all orders for this visit:    1. Anemia, unspecified type  Mild anemia, patient states that this has been ongoing (always) for him. -     CBC WITH AUTOMATED DIFF; Future  -     VITAMIN B12; Future  -     IRON PROFILE; Future  -     FERRITIN; Future    2. Palpitations  Ongoing, treatment is for patient's anxiety mostly. His psychiatrist has endorsed ongoing treatment. -     propranolol LA (INDERAL LA) 80 mg SR capsule; Take 1 Cap by mouth daily. 3. Bipolar affective disorder, remission status unspecified ([address]  Continue with current management. 4. Tinea capitis  -     ketoconazole 1 % sham; Apply thin layer to wet hair, lather, and rinse thoroughly; repeat. Use every 3 to 4 days for 8 weeks. 5. Lymph node edema  Reduced in size since last week. Advised patient to make a follow-up apt If/when this happens again as soon as possible and I will order an ultrasound. Monitor for now. continue current healthy lifestyle patterns     . No orders of the defined types were placed in this encounter.

## 2019-07-31 NOTE — LETTER
NOTIFICATION RETURN TO WORK / SCHOOL 
 
7/31/2019 12:04 PM 
 
Mr. Gila Clarke [place] [id number] [facility] [address] To Whom It May Concern: 
 
Gila Clarke is currently under the care of [address] [place] [address]. Mr. Ivette Campbell had an appointment this morning at our office at 8:30am. 
 
If there are questions or concerns please have the patient contact our office.  
 
 
 
Sincerely, 
 
 
Ag Hicks PA-C

## 2019-07-31 NOTE — LETTER
NOTIFICATION RETURN TO WORK / SCHOOL 
 
7/31/2019 9:10 AM 
 
Mr. Law Courtney [place] [id number] [facility] [address] To Whom It May Concern: 
 
Law Courtney is currently under the care of [facility]. He had an appointment today in our office. If there are questions or concerns please have the patient contact our office.  
 
 
 
Sincerely, 
 
 
Dave Murray PA-C

## 2019-07-31 NOTE — PROGRESS NOTES
Patient is here for completed physical.    1. Have you been to the ER, urgent care clinic since your last visit? Hospitalized since your last visit?no    2. Have you seen or consulted any other health care providers outside of the [address] [place] [address] since your last visit? Include any pap smears or colon screening.  no

## 2019-10-22 DIAGNOSIS — R00.2 PALPITATIONS: Chronic | ICD-10-CM

## 2019-10-22 NOTE — TELEPHONE ENCOUNTER
10/19/19  Per fax,     Requested Prescriptions     Pending Prescriptions Disp Refills    propranolol LA (INDERAL LA) 80 mg SR capsule 90 Cap 0     Sig: Take 1 Cap by mouth daily.

## 2019-10-23 RX ORDER — PROPRANOLOL HYDROCHLORIDE 80 MG/1
80 CAPSULE, EXTENDED RELEASE ORAL DAILY
Qty: 90 CAP | Refills: 0 | Status: SHIPPED | OUTPATIENT
Start: 2019-10-23

## 2019-10-28 ENCOUNTER — OFFICE VISIT (OUTPATIENT)
Dept: FAMILY MEDICINE CLINIC | Age: 29
End: 2019-10-28

## 2019-10-28 VITALS
HEART RATE: 60 BPM | BODY MASS INDEX: 26.7 KG/M2 | OXYGEN SATURATION: 96 % | RESPIRATION RATE: 18 BRPM | HEIGHT: 68 IN | SYSTOLIC BLOOD PRESSURE: 121 MMHG | WEIGHT: 176.2 LBS | DIASTOLIC BLOOD PRESSURE: 77 MMHG | TEMPERATURE: 98 F

## 2019-10-28 DIAGNOSIS — R05.9 COUGH: Primary | ICD-10-CM

## 2019-10-28 RX ORDER — PREDNISONE 10 MG/1
TABLET ORAL
Qty: 21 TAB | Refills: 0 | Status: SHIPPED | OUTPATIENT
Start: 2019-10-28 | End: 2019-11-14 | Stop reason: ALTCHOICE

## 2019-10-28 RX ORDER — ALBUTEROL SULFATE 90 UG/1
2 AEROSOL, METERED RESPIRATORY (INHALATION)
Qty: 1 INHALER | Refills: 0 | Status: SHIPPED | OUTPATIENT
Start: 2019-10-28 | End: 2020-12-03

## 2019-10-28 RX ORDER — AZITHROMYCIN 250 MG/1
TABLET, FILM COATED ORAL
Qty: 6 TAB | Refills: 0 | Status: SHIPPED | OUTPATIENT
Start: 2019-10-28 | End: 2019-11-02

## 2019-10-28 NOTE — PROGRESS NOTES
HISTORY OF PRESENT ILLNESS  Marita Caballero is a 29 y.o. male. HPI  C/o started 8 days ago with cough and chest congestion, getting worse in the last few days, associated with wheezing on/off, and coughing yellow sputum  Review of Systems   Constitutional: Negative for chills and fever. HENT: Negative for ear pain and sore throat. Respiratory: Negative for hemoptysis and shortness of breath. Cardiovascular: Negative for chest pain. Skin: Negative for rash. Physical Exam   HENT:   Right Ear: Tympanic membrane normal.   Left Ear: Tympanic membrane normal.   Mouth/Throat: Posterior oropharyngeal erythema present. No oropharyngeal exudate. Neck: Neck supple. Cardiovascular: Normal rate, regular rhythm and normal heart sounds. Pulmonary/Chest: Effort normal. He has no wheezes. He has rhonchi (bilateral expiratory). Lymphadenopathy:     He has no cervical adenopathy. Vitals reviewed. ASSESSMENT and PLAN  Diagnoses and all orders for this visit:    1. Cough, acute bronchitis  -     azithromycin (ZITHROMAX) 250 mg tablet; Take 2 tablets today, then take 1 tablet daily  -     predniSONE (STERAPRED DS) 10 mg dose pack; See administration instruction per 10mg dose pack  -     albuterol (PROVENTIL HFA, VENTOLIN HFA, PROAIR HFA) 90 mcg/actuation inhaler; Take 2 Puffs by inhalation every four (4) hours as needed for Wheezing or Shortness of Breath.   Mucinex DM otc prn  rtc next week if not better

## 2019-10-28 NOTE — PATIENT INSTRUCTIONS
Cough: Care Instructions Your Care Instructions A cough is your body's response to something that bothers your throat or airways. Many things can cause a cough. You might cough because of a cold or the flu, bronchitis, or asthma. Smoking, postnasal drip, allergies, and stomach acid that backs up into your throat also can cause coughs. A cough is a symptom, not a disease. Most coughs stop when the cause, such as a cold, goes away. You can take a few steps at home to cough less and feel better. Follow-up care is a key part of your treatment and safety. Be sure to make and go to all appointments, and call your doctor if you are having problems. It's also a good idea to know your test results and keep a list of the medicines you take. How can you care for yourself at home? · Drink lots of water and other fluids. This helps thin the mucus and soothes a dry or sore throat. Honey or lemon juice in hot water or tea may ease a dry cough. · Take cough medicine as directed by your doctor. · Prop up your head on pillows to help you breathe and ease a dry cough. · Try cough drops to soothe a dry or sore throat. Cough drops don't stop a cough. Medicine-flavored cough drops are no better than candy-flavored drops or hard candy. · Do not smoke. Avoid secondhand smoke. If you need help quitting, talk to your doctor about stop-smoking programs and medicines. These can increase your chances of quitting for good. When should you call for help? Call 911 anytime you think you may need emergency care.  For example, call if: 
  · You have severe trouble breathing.  
 Call your doctor now or seek immediate medical care if: 
  · You cough up blood.  
  · You have new or worse trouble breathing.  
  · You have a new or higher fever.  
  · You have a new rash.  
 Watch closely for changes in your health, and be sure to contact your doctor if: 
  · You cough more deeply or more often, especially if you notice more mucus or a change in the color of your mucus.  
  · You have new symptoms, such as a sore throat, an earache, or sinus pain.  
  · You do not get better as expected. Where can you learn more? Go to http://edwardo-marques.info/. Enter D279 in the search box to learn more about \"Cough: Care Instructions. \" Current as of: June 9, 2019 Content Version: 12.2 © 3760-7411 [employer]. Care instructions adapted under license by [employer] (which disclaims liability or warranty for this information). If you have questions about a medical condition or this instruction, always ask your healthcare professional. [address] any warranty or liability for your use of this information. Bronchitis: Care Instructions Your Care Instructions Bronchitis is inflammation of the bronchial tubes, which carry air to the lungs. The tubes swell and produce mucus, or phlegm. The mucus and inflamed bronchial tubes make you cough. You may have trouble breathing. Most cases of bronchitis are caused by viruses like those that cause colds. Antibiotics usually do not help and they may be harmful. Bronchitis usually develops rapidly and lasts about 2 to 3 weeks in otherwise healthy people. Follow-up care is a key part of your treatment and safety. Be sure to make and go to all appointments, and call your doctor if you are having problems. It's also a good idea to know your test results and keep a list of the medicines you take. How can you care for yourself at home? · Take all medicines exactly as prescribed. Call your doctor if you think you are having a problem with your medicine. · Get some extra rest. 
· Take an over-the-counter pain medicine, such as acetaminophen (Tylenol), ibuprofen (Advil, Motrin), or naproxen (Aleve) to reduce fever and relieve body aches. Read and follow all instructions on the label. · Do not take two or more pain medicines at the same time unless the doctor told you to. Many pain medicines have acetaminophen, which is Tylenol. Too much acetaminophen (Tylenol) can be harmful. · Take an over-the-counter cough medicine that contains dextromethorphan to help quiet a dry, hacking cough so that you can sleep. Avoid cough medicines that have more than one active ingredient. Read and follow all instructions on the label. · Breathe moist air from a humidifier, hot shower, or sink filled with hot water. The heat and moisture will thin mucus so you can cough it out. · Do not smoke. Smoking can make bronchitis worse. If you need help quitting, talk to your doctor about stop-smoking programs and medicines. These can increase your chances of quitting for good. When should you call for help? Call 911 anytime you think you may need emergency care. For example, call if: 
  · You have severe trouble breathing.  
 Call your doctor now or seek immediate medical care if: 
  · You have new or worse trouble breathing.  
  · You cough up dark brown or bloody mucus (sputum).  
  · You have a new or higher fever.  
  · You have a new rash.  
 Watch closely for changes in your health, and be sure to contact your doctor if: 
  · You cough more deeply or more often, especially if you notice more mucus or a change in the color of your mucus.  
  · You are not getting better as expected. Where can you learn more? Go to http://edwardo-marques.info/. Enter H333 in the search box to learn more about \"Bronchitis: Care Instructions. \" Current as of: June 9, 2019 Content Version: 12.2 © 0461-3069 [employer]. Care instructions adapted under license by [employer] (which disclaims liability or warranty for this information).  If you have questions about a medical condition or this instruction, always ask your healthcare professional. [employer] disclaims any warranty or liability for your use of this information. Take Mucinex DM OTC as needed

## 2019-10-28 NOTE — PROGRESS NOTES
Argenis Muñoz is a 29 y.o. male (: 1990) presenting to address:    Chief Complaint   Patient presents with    Cough     With mucus       Vitals:    10/28/19 1001   BP: 121/77   Pulse: 60   Resp: 18   Temp: 98 °F (36.7 °C)   TempSrc: Oral   SpO2: 96%   Weight: 176 lb 3.2 oz (79.9 kg)   Height: 5' 8\" (1.727 m)   PainSc:   4   PainLoc: Generalized       Learning Assessment:     Learning Assessment 2015   PRIMARY LEARNER Patient   HIGHEST LEVEL OF EDUCATION - PRIMARY LEARNER  2 YEARS OF COLLEGE   BARRIERS PRIMARY LEARNER NONE   CO-LEARNER CAREGIVER No   PRIMARY LANGUAGE ENGLISH    NEED No   LEARNER PREFERENCE PRIMARY READING   LEARNING SPECIAL TOPICS follow up   ANSWERED BY patient   RELATIONSHIP SELF   ASSESSMENT COMMENT n/a     Depression Screening:     3 most recent PHQ Screens 10/28/2019   Little interest or pleasure in doing things Not at all   Feeling down, depressed, irritable, or hopeless Not at all   Total Score PHQ 2 0     Fall Risk Assessment:     Fall Risk Assessment, last 12 mths 10/28/2019   Able to walk? Yes   Fall in past 12 months? No     Abuse Screening:     Abuse Screening Questionnaire 10/28/2019   Do you ever feel afraid of your partner? N   Are you in a relationship with someone who physically or mentally threatens you? N   Is it safe for you to go home? Y     Coordination of Care Questionaire:   1. Have you been to the ER, urgent care clinic since your last visit? Hospitalized since your last visit? NO    2. Have you seen or consulted any other health care providers outside of the [address] [place] [address] since your last visit? Include any pap smears or colon screening. NO    Advanced Directive:   1. Do you have an Advanced Directive? YES    2. Would you like information on Advanced Directives?  NO

## 2019-11-14 ENCOUNTER — OFFICE VISIT (OUTPATIENT)
Dept: NEUROLOGY | Age: 29
End: 2019-11-14

## 2019-11-14 VITALS
DIASTOLIC BLOOD PRESSURE: 70 MMHG | BODY MASS INDEX: 28.01 KG/M2 | TEMPERATURE: 97.4 F | HEART RATE: 66 BPM | WEIGHT: 184.8 LBS | RESPIRATION RATE: 20 BRPM | SYSTOLIC BLOOD PRESSURE: 100 MMHG | OXYGEN SATURATION: 97 % | HEIGHT: 68 IN

## 2019-11-14 DIAGNOSIS — G50.0 SUPRAORBITAL NEURALGIA: Primary | ICD-10-CM

## 2019-11-14 DIAGNOSIS — F34.1 DYSTHYMIA: ICD-10-CM

## 2019-11-14 RX ORDER — SUMATRIPTAN 100 MG/1
100 TABLET, FILM COATED ORAL
COMMUNITY
End: 2021-01-27 | Stop reason: SDUPTHER

## 2019-11-14 NOTE — PROGRESS NOTES
Damian Sethi is a 29 y.o. male new patient in today to discuss migraine; as referred by LEOLA Vail. Learning assessment previously completed 2/24/2015; primary language is [place].
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
## 2019-11-14 NOTE — LETTER
11/14/19 Patient: Ruth Pruitt YOB: 1990 Date of Visit: 11/14/2019 Glenn Francois PA-C 
[address] [address] VIA In Basket Dear Glenn Francois PA-C, Thank you for referring Mr. Tony Durham to [facility] for evaluation. My notes for this consultation are attached. If you have questions, please do not hesitate to call me. I look forward to following your patient along with you.  
 
 
Sincerely, 
 
Ede Reyes MD

## 2019-12-13 LAB
BASOPHILS # BLD AUTO: 0 X10E3/UL (ref 0–0.2)
BASOPHILS NFR BLD AUTO: 1 %
EOSINOPHIL # BLD AUTO: 0.1 X10E3/UL (ref 0–0.4)
EOSINOPHIL NFR BLD AUTO: 2 %
ERYTHROCYTE [DISTWIDTH] IN BLOOD BY AUTOMATED COUNT: 13.3 % (ref 12.3–15.4)
FERRITIN SERPL-MCNC: 39 NG/ML (ref 30–400)
HCT VFR BLD AUTO: 39.7 % (ref 37.5–51)
HGB BLD-MCNC: 12.7 G/DL (ref 13–17.7)
IMM GRANULOCYTES # BLD AUTO: 0 X10E3/UL (ref 0–0.1)
IMM GRANULOCYTES NFR BLD AUTO: 0 %
IRON SATN MFR SERPL: 21 % (ref 15–55)
IRON SERPL-MCNC: 52 UG/DL (ref 38–169)
LYMPHOCYTES # BLD AUTO: 1.7 X10E3/UL (ref 0.7–3.1)
LYMPHOCYTES NFR BLD AUTO: 28 %
MCH RBC QN AUTO: 28.2 PG (ref 26.6–33)
MCHC RBC AUTO-ENTMCNC: 32 G/DL (ref 31.5–35.7)
MCV RBC AUTO: 88 FL (ref 79–97)
MONOCYTES # BLD AUTO: 0.5 X10E3/UL (ref 0.1–0.9)
MONOCYTES NFR BLD AUTO: 8 %
NEUTROPHILS # BLD AUTO: 3.6 X10E3/UL (ref 1.4–7)
NEUTROPHILS NFR BLD AUTO: 61 %
PLATELET # BLD AUTO: 227 X10E3/UL (ref 150–450)
RBC # BLD AUTO: 4.5 X10E6/UL (ref 4.14–5.8)
TIBC SERPL-MCNC: 250 UG/DL (ref 250–450)
UIBC SERPL-MCNC: 198 UG/DL (ref 111–343)
VIT B12 SERPL-MCNC: 619 PG/ML (ref 232–1245)
WBC # BLD AUTO: 6 X10E3/UL (ref 3.4–10.8)

## 2019-12-29 NOTE — PATIENT DISCUSSION
1.  Episcleritis OD/ questionable OS VS. TAMMY (Etiology uncertain)- Pt using drops OU since symptoms started OS after last visit. Increased IOP on Durezol. Decrease Durezol to QD OU till seen. Will do slow taper if improved. NO CTLs until seen again. 2. TAMMY w/ PEK OU- The use of artificial tears OU QID were recommended. 3.  Allergic Conjunctivitis OU- Continue Zaditor BID OU 4. Blepharitis anterior type OU - Continue Daily warm compresses and lid scrubs were recommended. 5. Return for an appointment for a 10 in 1 month with Dr. Anais Gaona.
Droplet

## 2020-02-13 ENCOUNTER — TELEPHONE (OUTPATIENT)
Dept: NEUROLOGY | Age: 30
End: 2020-02-13

## 2020-02-13 ENCOUNTER — OFFICE VISIT (OUTPATIENT)
Dept: NEUROLOGY | Age: 30
End: 2020-02-13

## 2020-02-13 VITALS
RESPIRATION RATE: 16 BRPM | BODY MASS INDEX: 29.55 KG/M2 | OXYGEN SATURATION: 97 % | TEMPERATURE: 97.8 F | HEART RATE: 75 BPM | HEIGHT: 68 IN | SYSTOLIC BLOOD PRESSURE: 112 MMHG | DIASTOLIC BLOOD PRESSURE: 74 MMHG | WEIGHT: 195 LBS

## 2020-02-13 DIAGNOSIS — G50.0 SUPRAORBITAL NEURALGIA: Primary | ICD-10-CM

## 2020-02-13 DIAGNOSIS — F34.1 DYSTHYMIA: ICD-10-CM

## 2020-02-13 RX ORDER — BUPIVACAINE HYDROCHLORIDE 5 MG/ML
INJECTION, SOLUTION EPIDURAL; INTRACAUDAL
Refills: 0 | Status: CANCELLED
Start: 2020-02-13

## 2020-02-13 NOTE — PROGRESS NOTES
Identified pt with two pt identifiers(name and ). Reviewed record in preparation for visit and have obtained necessary documentation. Chief Complaint   Patient presents with    Follow-up     3 month         There are no preventive care reminders to display for this patient. Coordination of Care Questionnaire:  :   1) Have you been to an emergency room, urgent care, or hospitalized since your last visit? If yes, where when, and reason for visit? no       2. Have seen or consulted any other health care provider since your last visit? If yes, where when, and reason for visit? NO          Learning Assessment 2015   PRIMARY LEARNER Patient   HIGHEST LEVEL OF EDUCATION - PRIMARY LEARNER  2 YEARS OF COLLEGE   BARRIERS PRIMARY LEARNER NONE   CO-LEARNER CAREGIVER No   PRIMARY LANGUAGE ENGLISH    NEED No   LEARNER PREFERENCE PRIMARY READING   LEARNING SPECIAL TOPICS follow up   ANSWERED BY patient   RELATIONSHIP SELF   ASSESSMENT COMMENT n/a        3 most recent PHQ Screens 10/28/2019   Little interest or pleasure in doing things Not at all   Feeling down, depressed, irritable, or hopeless Not at all   Total Score PHQ 2 0        Abuse Screening Questionnaire 10/28/2019   Do you ever feel afraid of your partner? N   Are you in a relationship with someone who physically or mentally threatens you? N   Is it safe for you to go home? Y        Fall Risk Assessment, last 12 mths 10/28/2019   Able to walk? Yes   Fall in past 12 months?  No
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
## 2020-02-13 NOTE — PROGRESS NOTES
After informed consent and following aseptic techniques, a left supraorbital nerve block was performed for treatment of intractable left supraorbital neuralgia. 3 ML of  bupivacaine 0.75% solution was mixed with 1 mL of 40 mg methylprednisolone solution (40 mg per 1 cc) were reconstituted. The patient was placed in the supine position on the procedure was explained as well as risks and benefits. The tender left supraorbital nerve was palpated and the solution for a total of 4 mL's was injected with a medial orientation just above the supraorbital ridge along the trajectory of the supraorbital and supra trochlear nerves. No immediate complications were observed. The patient did report anesthesia in the supraorbital nerve distribution after about 8 to 10 minutes and this was corroborated on examination. He will return in 1 month to assess effectiveness. Advised the patient to watch for any side effects, excessive sweating or bleeding, and to contact with any issues. Advised him to put some ice for 15 to 20 minutes repeatedly throughout the day and take anti-inflammatories as needed.     METHYLPREDNISOLONE 40MG 1ML Act-O vial  Lot # [id number]  EXP 12/2020  WASTE-0ml    BUPIVACAINE 0.75% 10 ML VIAL SINGLE USE  BATCH [id number]  EXP 04/2021  WASTE-7ML    [facility] AT [facility]  OFFICE PROCEDURE PROGRESS NOTE        Chart reviewed for the following:   Audrey Dixon MD, have reviewed the History, Physical and updated the Allergic reactions for SAYDA Richter [address] 46 performed immediately prior to start of procedure:   Audrey Dixon MD, have performed the following reviews on [address] [place] [address] prior to the start of the procedure:            * Patient was identified by name and date of birth   * Agreement on procedure being performed was verified  * Risks and Benefits explained to the patient  * Procedure site verified and marked as necessary  * Patient was positioned for comfort  * Consent was signed and verified     Time: 10:30am      Date of procedure: 2/13/2020    Procedure performed by:  Skylar Rodney MD    Provider assisted by: NONE    Patient assisted by: self    How tolerated by patient: tolerated the procedure well with no complications    Post Procedural Pain Scale: 0 - No Hurt    Comments: none

## 2020-02-14 RX ORDER — BUPIVACAINE HYDROCHLORIDE 7.5 MG/ML
22.5 INJECTION, SOLUTION EPIDURAL; RETROBULBAR ONCE
Qty: 3 ML | Refills: 0 | Status: SHIPPED | OUTPATIENT
Start: 2020-02-14 | End: 2020-02-14

## 2020-02-14 RX ORDER — METHYLPREDNISOLONE ACETATE 40 MG/ML
40 INJECTION, SUSPENSION INTRA-ARTICULAR; INTRALESIONAL; INTRAMUSCULAR; SOFT TISSUE
Qty: 1 VIAL | Refills: 0
Start: 2020-02-14 | End: 2020-02-14

## 2020-02-17 ENCOUNTER — TELEPHONE (OUTPATIENT)
Dept: NEUROLOGY | Age: 30
End: 2020-02-17

## 2020-02-17 NOTE — TELEPHONE ENCOUNTER
Notification that BUPIVACAINE product is backordered or unavailable until mid-April rec'd from [facility]. Request for alternative. Scanned to chart for review.

## 2020-02-18 NOTE — TELEPHONE ENCOUNTER
Order needs to be entered for coding purposes, so it was not my intention to get more bupivacaine. If/when we need more we could order lidocaine instead if it is not available.

## 2020-03-19 ENCOUNTER — OFFICE VISIT (OUTPATIENT)
Dept: NEUROLOGY | Age: 30
End: 2020-03-19

## 2020-03-19 VITALS
TEMPERATURE: 97.8 F | DIASTOLIC BLOOD PRESSURE: 70 MMHG | RESPIRATION RATE: 18 BRPM | HEART RATE: 65 BPM | BODY MASS INDEX: 29.25 KG/M2 | HEIGHT: 68 IN | WEIGHT: 193 LBS | SYSTOLIC BLOOD PRESSURE: 120 MMHG | OXYGEN SATURATION: 98 %

## 2020-03-19 DIAGNOSIS — M54.81 OCCIPITAL NEURALGIA OF RIGHT SIDE: ICD-10-CM

## 2020-03-19 DIAGNOSIS — G43.009 MIGRAINE WITHOUT AURA AND WITHOUT STATUS MIGRAINOSUS, NOT INTRACTABLE: ICD-10-CM

## 2020-03-19 DIAGNOSIS — G50.0 SUPRAORBITAL NEURALGIA: Primary | ICD-10-CM
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
## 2020-03-19 NOTE — PATIENT INSTRUCTIONS
Thank you for choosing [address] [place] [address] and [address] [place] [address] Neurology Clinic for your  
 
care. You may receive a survey about your visit. We appreciate you taking time  
 
to complete this survey as we use your feedback to improve our services. We  
 
realize we are not perfect, but we strive to provide excellent care. Neck Arthritis: Exercises Introduction Here are some examples of exercises for you to try. The exercises may be suggested for a condition or for rehabilitation. Start each exercise slowly. Ease off the exercises if you start to have pain. You will be told when to start these exercises and which ones will work best for you. How to do the exercises Neck stretches to the side 1. This stretch works best if you keep your shoulder down as you lean away from it. To help you remember to do this, start by relaxing your shoulders and lightly holding on to your thighs or your chair. 2. Tilt your head toward your shoulder and hold for 15 to 30 seconds. Let the weight of your head stretch your muscles. 3. Repeat 2 to 4 times toward each shoulder. Chin tuck 1. Lie on the floor with a rolled-up towel under your neck. Your head should be touching the floor. 2. Slowly bring your chin toward your chest. 
3. Hold for a count of 6, and then relax for up to 10 seconds. 4. Repeat 8 to 12 times. Active cervical rotation 1. Sit in a firm chair, or stand up straight. 2. Keeping your chin level, turn your head to the right, and hold for 15 to 30 seconds. 3. Turn your head to the left and hold for 15 to 30 seconds. 4. Repeat 2 to 4 times to each side. Shoulder blade squeeze 1. While standing, squeeze your shoulder blades together. 2. Do not raise your shoulders up as you are squeezing. 3. Hold for 6 seconds. 4. Repeat 8 to 12 times. Shoulder rolls 1. Sit comfortably with your feet shoulder-width apart. You can also do this exercise standing up. 2. Roll your shoulders up, then back, and then down in a smooth, circular motion. 3. Repeat 2 to 4 times. Follow-up care is a key part of your treatment and safety. Be sure to make and go to all appointments, and call your doctor if you are having problems. It's also a good idea to know your test results and keep a list of the medicines you take. Where can you learn more? Go to http://edwardo-marques.info/ Enter F592 in the search box to learn more about \"Neck Arthritis: Exercises. \" Current as of: June 26, 2019Content Version: 12.4 © 2236-0203 [employer]. Care instructions adapted under license by [employer] (which disclaims liability or warranty for this information). If you have questions about a medical condition or this instruction, always ask your healthcare professional. [place] [address] any warranty or liability for your use of this information. Neck: Exercises Introduction Here are some examples of exercises for you to try. The exercises may be suggested for a condition or for rehabilitation. Start each exercise slowly. Ease off the exercises if you start to have pain. You will be told when to start these exercises and which ones will work best for you. How to do the exercises Neck stretch 1. This stretch works best if you keep your shoulder down as you lean away from it. To help you remember to do this, start by relaxing your shoulders and lightly holding on to your thighs or your chair. 2. Tilt your head toward your shoulder and hold for 15 to 30 seconds. Let the weight of your head stretch your muscles. 3. If you would like a little added stretch, use your hand to gently and steadily pull your head toward your shoulder. For example, keeping your right shoulder down, lean your head to the left. 4. Repeat 2 to 4 times toward each shoulder. Diagonal neck stretch 1. Turn your head slightly toward the direction you will be stretching, and tilt your head diagonally toward your chest and hold for 15 to 30 seconds. 2. If you would like a little added stretch, use your hand to gently and steadily pull your head forward on the diagonal. 
3. Repeat 2 to 4 times toward each side. Dorsal glide stretch 1. Sit or stand tall and look straight ahead. 2. Slowly tuck your chin as you glide your head backward over your body 3. Hold for a count of 6, and then relax for up to 10 seconds. 4. Repeat 8 to 12 times. Chest and shoulder stretch 1. Sit or stand tall and glide your head backward as in the dorsal glide stretch. 2. Raise both arms so that your hands are next to your ears. 3. Take a deep breath, and as you breathe out, lower your elbows down and behind your back. You will feel your shoulder blades slide down and together, and at the same time you will feel a stretch across your chest and the front of your shoulders. 4. Hold for about 6 seconds, and then relax for up to 10 seconds. 5. Repeat 8 to 12 times. Strengthening: Hands on head 1. Move your head backward, forward, and side to side against gentle pressure from your hands, holding each position for about 6 seconds. 2. Repeat 8 to 12 times. Follow-up care is a key part of your treatment and safety. Be sure to make and go to all appointments, and call your doctor if you are having problems. It's also a good idea to know your test results and keep a list of the medicines you take. Where can you learn more? Go to http://edwadro-marques.info/ Enter P975 in the search box to learn more about \"Neck: Exercises. \" Current as of: June 26, 2019Content Version: 12.4 © 3447-6523 [employer]. Care instructions adapted under license by [employer] (which disclaims liability or warranty for this information).  If you have questions about a medical condition or this instruction, always ask your healthcare professional. [place] [address] any warranty or liability for your use of this information.

## 2020-03-19 NOTE — PROGRESS NOTES
Chief Complaint   Patient presents with    Migraine     follow up       Thanh Leon presents today for   Chief Complaint   Patient presents with    Migraine     follow up       Is someone accompanying this pt? no    Is the patient using any DME equipment during [address]? no    Depression Screening:  3 most recent PHQ Screens 10/28/2019   Little interest or pleasure in doing things Not at all   Feeling down, depressed, irritable, or hopeless Not at all   Total Score PHQ 2 0       Learning Assessment:  Learning Assessment 2/24/2015   PRIMARY LEARNER Patient   HIGHEST LEVEL OF EDUCATION - PRIMARY LEARNER  [address]ARS [facility] PRIMARY LEARNER NONE   CO-LEARNER CAREGIVER No   PRIMARY LANGUAGE ENGLISH    NEED No   LEARNER PREFERENCE PRIMARY READING   LEARNING SPECIAL TOPICS follow up   ANSWERED BY patient   RELATIONSHIP SELF   ASSESSMENT COMMENT n/a       Abuse Screening:  Abuse Screening Questionnaire 10/28/2019   Do you ever feel afraid of your partner? N   Are you in a relationship with someone who physically or mentally threatens you? N   Is it safe for you to go home? Y       Fall Risk  Fall Risk Assessment, last 12 mths 10/28/2019   Able to walk? Yes   Fall in past 12 months? No         Coordination of Care:  1. Have you been to the ER, urgent care clinic since your last visit? Hospitalized since your last visit? no    2. Have you seen or consulted any other health care providers outside of the [address] [place] [address] since your last visit? Include any pap smears or colon screening.  no

## 2020-10-22 ENCOUNTER — IMPORTED ENCOUNTER (OUTPATIENT)
Dept: URBAN - METROPOLITAN AREA CLINIC 1 | Facility: CLINIC | Age: 30
End: 2020-10-22

## 2020-10-22 PROBLEM — H52.13: Noted: 2020-10-22

## 2020-10-22 PROBLEM — H52.223: Noted: 2020-10-22

## 2020-10-22 PROCEDURE — S0621 ROUTINE OPHTHALMOLOGICAL EXA: HCPCS

## 2020-10-22 NOTE — PATIENT DISCUSSION
1. Myopia: Rx was given for correction if indicated and requested. 2. Astigmatism OU- Rx was given for correction if indicated and requested. CTL Trials ordered. Return for an appointment in 1 week CC with Dr. Moni Mckeon.

## 2020-11-03 ENCOUNTER — IMPORTED ENCOUNTER (OUTPATIENT)
Dept: URBAN - METROPOLITAN AREA CLINIC 1 | Facility: CLINIC | Age: 30
End: 2020-11-03

## 2020-11-03 NOTE — PATIENT DISCUSSION
1. CC Today -- Finalized CTL Rx today and given to patient. Small change made to [place] OU and Sph power OU (-1.00 to -0.75). However pt states good comfort fit and vision. Return for an appointment in 1 year 40/cc with Dr. Miguel Bryant.

## 2020-12-10 NOTE — PROCEDURE NOTE: SURGICAL
Prior to commencing surgery, patient identification, surgical procedure, site, and side were confirmed by Dr. Jennifer Chan. Following topical proparacaine anesthesia, the patient was positioned at the YAG laser, a contact lens coupled to the cornea of the right eye with methylcellulose and a peripheral iridotomy placed using 3.4 Mj x 12@ 8 o'clock and 5@ 4 o'clock without complication. Attention was turned to the left eye and the patient was positioned at the YAG laser, a contact lens coupled to the cornea with methylcellulose and a peripheral iridotomy placed using 3.6 Mj x 4 @ 8 o'clock and [email or web address] o'clock without complication. Excess methylcellulose was washed from both eyes, one drop of Econopred Plus 1% instilled and the patient returned to the holding area having tolerated the procedure well and without complication. <br />[id number]<HO /><br />

## 2021-01-27 ENCOUNTER — VIRTUAL VISIT (OUTPATIENT)
Dept: NEUROLOGY | Age: 31
End: 2021-01-27

## 2021-01-27 DIAGNOSIS — G44.229 CHRONIC TENSION-TYPE HEADACHE, NOT INTRACTABLE: ICD-10-CM

## 2021-01-27 DIAGNOSIS — G43.009 MIGRAINE WITHOUT AURA AND WITHOUT STATUS MIGRAINOSUS, NOT INTRACTABLE: Primary | ICD-10-CM

## 2021-01-27 PROCEDURE — 99214 OFFICE O/P EST MOD 30 MIN: CPT | Performed by: STUDENT IN AN ORGANIZED HEALTH CARE EDUCATION/TRAINING PROGRAM

## 2021-01-27 RX ORDER — ERENUMAB-AOOE 70 MG/ML
70 INJECTION SUBCUTANEOUS ONCE
Qty: 1 EACH | Refills: 0 | Status: SHIPPED | OUTPATIENT
Start: 2021-01-27 | End: 2021-01-27

## 2021-01-27 RX ORDER — ERENUMAB-AOOE 70 MG/ML
70 INJECTION SUBCUTANEOUS
Qty: 1 EACH | Refills: 3 | Status: SHIPPED | OUTPATIENT
Start: 2021-01-27 | End: 2021-03-10 | Stop reason: DRUGHIGH

## 2021-01-27 RX ORDER — SUMATRIPTAN 100 MG/1
100 TABLET, FILM COATED ORAL
Qty: 9 TAB | Refills: 3 | Status: SHIPPED | OUTPATIENT
Start: 2021-01-27 | End: 2021-01-27

## 2021-01-27 RX ORDER — ERENUMAB-AOOE 70 MG/ML
70 INJECTION SUBCUTANEOUS ONCE
Qty: 1 EACH | Refills: 3 | Status: SHIPPED | OUTPATIENT
Start: 2021-01-27 | End: 2021-01-27
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
## 2021-01-27 NOTE — PROGRESS NOTES
Lenora Torres presents today for   Chief Complaint   Patient presents with    Migraine     follow up       Is someone accompanying this pt? Virtual visit E-mail : [email or web address]    Is the patient using any DME equipment during [facility]? [facility]    Depression Screening:  3 most recent PHQ Screens 10/28/2019   Little interest or pleasure in doing things Not at all   Feeling down, depressed, irritable, or hopeless Not at all   Total Score PHQ 2 0       Learning Assessment:  Learning Assessment 2/24/2015   PRIMARY LEARNER Patient   HIGHEST LEVEL OF EDUCATION - PRIMARY LEARNER  2 YEARS OF COLLEGE   BARRIERS PRIMARY LEARNER NONE   CO-LEARNER CAREGIVER No   PRIMARY LANGUAGE ENGLISH    NEED No   LEARNER PREFERENCE PRIMARY READING   LEARNING SPECIAL TOPICS follow up   ANSWERED BY patient   RELATIONSHIP SELF   ASSESSMENT COMMENT n/a       Abuse Screening:  Abuse Screening Questionnaire 10/28/2019   Do you ever feel afraid of your partner? N   Are you in a relationship with someone who physically or mentally threatens you? N   Is it safe for you to go home? Y       Fall Risk  Fall Risk Assessment, last 12 mths 10/28/2019   Able to walk? Yes   Fall in past 12 months? No         Coordination of Care:  1. Have you been to the ER, urgent care clinic since your last visit? Hospitalized since your last visit? no    2. Have you seen or consulted any other health care providers outside of the [address] [place] [address] since your last visit? Include any pap smears or colon screening.  [facility]

## 2021-03-10 DIAGNOSIS — G43.009 MIGRAINE WITHOUT AURA AND WITHOUT STATUS MIGRAINOSUS, NOT INTRACTABLE: Primary | ICD-10-CM

## 2022-01-14 NOTE — PATIENT DISCUSSION
Prior to laser, risks/benefits/alternatives to laser discussed including loss of vision, decreased peripheral and night vision, need for more laser and/or surgery and patient wished to proceed.

## 2022-01-14 NOTE — PROCEDURE NOTE: CLINICAL
PROCEDURE NOTE: Laser for Non RD OD. Diagnosis: Retinoschisis. Prior to laser, risks/benefits/alternatives to laser discussed including loss of vision, decreased peripheral and night vision, need for more laser and/or surgery and patient wished to proceed. A written consent is on file, and the need for today’s laser was discussed and the patient is understanding and wishes to proceed. Laser Lens: *. Wavelength: Argon Green. Spot size: 200 um. Pulse power: 400 mW. Number of pulses: 21. Patient tolerated procedure well. There were no complications. Post-op instructions given. Patient given office phone number/answering service number and advised to call immediately should there be loss of vision or pain, or should they have any other questions or concerns. Brent Banegas

## 2022-04-02 ASSESSMENT — VISUAL ACUITY
OD_SC: 20/25
OD_SC: J1+
OD_SC: 20/20
OD_SC: 20/20
OS_SC: 20/25
OU_CC: 20/25
OS_SC: 20/20
OS_SC: 20/20
OD_SC: 20/20
OS_SC: 20/25
OD_CC: J1+
OS_SC: 20/20
OS_CC: 20/50
OU_SC: 20/20-1
OS_SC: J1+
OS_CC: J1+
OD_CC: 20/50
OD_SC: 20/25

## 2022-04-02 ASSESSMENT — KERATOMETRY
OD_AXISANGLE2_DEGREES: 087
OD_K2POWER_DIOPTERS: 44.75
OD_AXISANGLE_DEGREES: 177
OS_AXISANGLE2_DEGREES: 088
OS_AXISANGLE_DEGREES: 178
OD_K1POWER_DIOPTERS: 43.25
OS_K2POWER_DIOPTERS: 44.75
OS_K1POWER_DIOPTERS: 44.75

## 2022-04-02 ASSESSMENT — TONOMETRY
OD_IOP_MMHG: 17
OD_IOP_MMHG: 18
OS_IOP_MMHG: 13
OS_IOP_MMHG: 16
OS_IOP_MMHG: 19
OD_IOP_MMHG: 13
OS_IOP_MMHG: 17
OD_IOP_MMHG: 19